# Patient Record
Sex: MALE | Race: WHITE | NOT HISPANIC OR LATINO | Employment: OTHER | URBAN - METROPOLITAN AREA
[De-identification: names, ages, dates, MRNs, and addresses within clinical notes are randomized per-mention and may not be internally consistent; named-entity substitution may affect disease eponyms.]

---

## 2017-03-09 DIAGNOSIS — M25.562 CHRONIC PAIN OF LEFT KNEE: ICD-10-CM

## 2017-03-09 DIAGNOSIS — G89.29 CHRONIC PAIN OF LEFT KNEE: ICD-10-CM

## 2017-04-07 ENCOUNTER — HOSPITAL ENCOUNTER (OUTPATIENT)
Dept: RADIOLOGY | Facility: MEDICAL CENTER | Age: 67
End: 2017-04-07
Attending: NURSE PRACTITIONER
Payer: MEDICARE

## 2017-04-07 DIAGNOSIS — G89.29 CHRONIC PAIN OF LEFT KNEE: ICD-10-CM

## 2017-04-07 DIAGNOSIS — M25.562 CHRONIC PAIN OF LEFT KNEE: ICD-10-CM

## 2017-04-07 PROCEDURE — 73721 MRI JNT OF LWR EXTRE W/O DYE: CPT | Mod: LT

## 2017-04-08 DIAGNOSIS — M25.562 ACUTE PAIN OF LEFT KNEE: ICD-10-CM

## 2017-06-02 RX ORDER — SIMVASTATIN 40 MG
TABLET ORAL
Qty: 90 TAB | Refills: 0 | Status: SHIPPED | OUTPATIENT
Start: 2017-06-02 | End: 2019-07-31

## 2017-10-10 ENCOUNTER — OFFICE VISIT (OUTPATIENT)
Dept: MEDICAL GROUP | Facility: PHYSICIAN GROUP | Age: 67
End: 2017-10-10
Payer: MEDICARE

## 2017-10-10 VITALS
WEIGHT: 162.2 LBS | BODY MASS INDEX: 27.69 KG/M2 | DIASTOLIC BLOOD PRESSURE: 80 MMHG | HEIGHT: 64 IN | OXYGEN SATURATION: 97 % | RESPIRATION RATE: 16 BRPM | TEMPERATURE: 98.4 F | HEART RATE: 86 BPM | SYSTOLIC BLOOD PRESSURE: 136 MMHG

## 2017-10-10 DIAGNOSIS — M25.562 ACUTE PAIN OF LEFT KNEE: ICD-10-CM

## 2017-10-10 DIAGNOSIS — C61 PROSTATE CANCER (HCC): ICD-10-CM

## 2017-10-10 DIAGNOSIS — E78.49 OTHER HYPERLIPIDEMIA: ICD-10-CM

## 2017-10-10 PROCEDURE — 99213 OFFICE O/P EST LOW 20 MIN: CPT | Performed by: NURSE PRACTITIONER

## 2017-10-10 ASSESSMENT — PATIENT HEALTH QUESTIONNAIRE - PHQ9: CLINICAL INTERPRETATION OF PHQ2 SCORE: 0

## 2017-10-10 NOTE — ASSESSMENT & PLAN NOTE
Patient was experiencing intermittent left knee pain. He was seen by orthopedics and treated with cortisone injections. He reports pain has decreased following treatment.

## 2017-10-11 NOTE — ASSESSMENT & PLAN NOTE
Patient has prostate cancer and is currently undergoing radiation treatments. He feels that he is doing well at this time. He took a leave of absence from [facility] receiving treatment.

## 2017-10-11 NOTE — PROGRESS NOTES
Subjective:     Chief Complaint   Patient presents with   • Prostate Cancer   • Medication Management   • Knee Pain     Lt        HPI:  Miguel Carranza is a 67 y.o. male here today to discuss the following:    Acute pain of left knee  Patient was experiencing intermittent left knee pain. He was seen by orthopedics and treated with cortisone injections. He reports pain has decreased following treatment.    Prostate cancer (CMS-HCC)  Patient has prostate cancer and is currently undergoing radiation treatments. He feels that he is doing well at this time. He took a leave of absence from [facility] receiving treatment.    Hyperlipidemia  Chronic Condition: Patient has hyperlipidemia and is currently taking simvastatin. He denies any chest pain, diaphoresis, shortness of breath, headaches, dizziness, blurred vision or myalgias.   Lab Results   Component Value Date/Time    CHOLSTRLTOT 212 (H) 10/21/2016 07:40 AM     (H) 10/21/2016 07:40 AM    HDL 43 10/21/2016 07:40 AM    TRIGLYCERIDE 174 (H) 10/21/2016 07:40 AM       Lab Results   Component Value Date/Time    SODIUM 140 10/21/2016 07:40 AM    SODIUM 138 01/21/2015 01:35 AM    POTASSIUM 5.1 10/21/2016 07:40 AM    POTASSIUM 4.0 01/21/2015 01:35 AM    CHLORIDE 101 10/21/2016 07:40 AM    CHLORIDE 106 01/21/2015 01:35 AM    CO2 22 10/21/2016 07:40 AM    CO2 27 01/21/2015 01:35 AM    GLUCOSE 109 (H) 10/21/2016 07:40 AM    GLUCOSE 96 01/21/2015 01:35 AM    BUN 17 10/21/2016 07:40 AM    BUN 14 01/21/2015 01:35 AM    CREATININE 1.07 10/21/2016 07:40 AM    CREATININE 1.09 01/21/2015 01:35 AM    BUNCREATRAT 16 10/21/2016 07:40 AM     Lab Results   Component Value Date/Time    ALKPHOSPHAT 78 10/21/2016 07:40 AM    ALKPHOSPHAT 81 01/12/2015 05:22 PM    ASTSGOT 21 10/21/2016 07:40 AM    ASTSGOT 20 01/12/2015 05:22 PM    ALTSGPT 30 10/21/2016 07:40 AM    ALTSGPT 32 01/12/2015 05:22 PM    TBILIRUBIN 0.4 10/21/2016 07:40 AM    TBILIRUBIN 0.4 01/12/2015 05:22 PM     "  .    Current medicines (including changes today)  Current Outpatient Prescriptions   Medication Sig Dispense Refill   • Ascorbic Acid (VITAMIN C ER PO) Take  by mouth.     • simvastatin (ZOCOR) 40 MG Tab TAKE 1 TAB BY MOUTH EVERY EVENING. 90 Tab 0   • Cholecalciferol (VITAMIN D) 2000 UNIT TABS Take 2,000 Units by mouth every day.     • therapeutic multivitamin-minerals (THERAGRAN-M) TABS Take 1 Tab by mouth every day.       No current facility-administered medications for this visit.        He  has a past medical history of Acute pain of left knee (11/1/2016); Cancer (CMS-[facility]) (01/15); Cold (01/04/15); Dental disorder; High cholesterol; Other specified disorder of intestines; and Snoring.    ROS   Review of Systems   Constitutional: Negative for fever, chills, weight loss and malaise/fatigue.   HENT: Negative for ear pain, nosebleeds, congestion, sore throat and neck pain.    Respiratory: Negative for cough, sputum production, shortness of breath and wheezing.    Cardiovascular: Negative for chest pain, palpitations,  and leg swelling.   Gastrointestinal: Negative for heartburn, nausea, vomiting, diarrhea and abdominal pain.   Neurological: Negative for dizziness, tingling, tremors, sensory change, focal weakness and headaches.   Psychiatric/Behavioral: Negative for depression, anxiety, suicidal ideas, insomnia and memory loss.    All other systems reviewed and are negative except as in HPI.     Objective:   Physical Exam:  Blood pressure 136/80, pulse 86, temperature 36.9 °C (98.4 °F), resp. rate 16, height 1.626 m (5' 4.02\"), weight 73.6 kg (162 lb 3.2 oz), SpO2 97 %. Body mass index is 27.82 kg/m².   Physical Exam:  Alert, oriented in no acute distress.  Eye contact is good, speech goal directed, affect calm  HEENT: conjunctiva non-injected, sclera non-icteric.  Pinna normal.   Neck No adenopathy or masses in the neck or supraclavicular regions.  Lungs: clear to auscultation bilaterally with good " excursion.  CV: regular rate and rhythm.   Abdomen: soft, nontender, No CVAT. Normal bowel sounds.  Ext: no edema, color normal, vascularity normal, temperature normal  MS: Normal gait and station    Assessment and Plan:   The following treatment plan was discussed  1. Other hyperlipidemia  COMP METABOLIC PANEL    LIPID PROFILE   2. Acute pain of left knee     3. Prostate cancer (CMS-HCC)      patient will complete labs following his last radiation treatment. He would like to postpone colonoscopy until radiation is completed. He will check with oncology prior to receiving an influenza vaccine.    Followup: Return in about 6 months (around 4/10/2018), or if symptoms worsen or fail to improve.   Please note that this dictation was created using voice recognition software. I have made every reasonable attempt to correct obvious errors, but I expect that there are errors of grammar and possibly content that I did not discover before finalizing the note.

## 2017-10-11 NOTE — ASSESSMENT & PLAN NOTE
Chronic Condition: Patient has hyperlipidemia and is currently taking simvastatin. He denies any chest pain, diaphoresis, shortness of breath, headaches, dizziness, blurred vision or myalgias.   Lab Results   Component Value Date/Time    CHOLSTRLTOT 212 (H) 10/21/2016 07:40 AM     (H) 10/21/2016 07:40 AM    HDL 43 10/21/2016 07:40 AM    TRIGLYCERIDE 174 (H) 10/21/2016 07:40 AM       Lab Results   Component Value Date/Time    SODIUM 140 10/21/2016 07:40 AM    SODIUM 138 01/21/2015 01:35 AM    POTASSIUM 5.1 10/21/2016 07:40 AM    POTASSIUM 4.0 01/21/2015 01:35 AM    CHLORIDE 101 10/21/2016 07:40 AM    CHLORIDE 106 01/21/2015 01:35 AM    CO2 22 10/21/2016 07:40 AM    CO2 27 01/21/2015 01:35 AM    GLUCOSE 109 (H) 10/21/2016 07:40 AM    GLUCOSE 96 01/21/2015 01:35 AM    BUN 17 10/21/2016 07:40 AM    BUN 14 01/21/2015 01:35 AM    CREATININE 1.07 10/21/2016 07:40 AM    CREATININE 1.09 01/21/2015 01:35 AM    BUNCREATRAT 16 10/21/2016 07:40 AM     Lab Results   Component Value Date/Time    ALKPHOSPHAT 78 10/21/2016 07:40 AM    ALKPHOSPHAT 81 01/12/2015 05:22 PM    ASTSGOT 21 10/21/2016 07:40 AM    ASTSGOT 20 01/12/2015 05:22 PM    ALTSGPT 30 10/21/2016 07:40 AM    ALTSGPT 32 01/12/2015 05:22 PM    TBILIRUBIN 0.4 10/21/2016 07:40 AM    TBILIRUBIN 0.4 01/12/2015 05:22 PM      .

## 2017-10-28 LAB
ALBUMIN SERPL-MCNC: 4.1 G/DL (ref 3.6–4.8)
ALBUMIN/GLOB SERPL: 2 {RATIO} (ref 1.2–2.2)
ALP SERPL-CCNC: 75 IU/L (ref 39–117)
ALT SERPL-CCNC: 27 IU/L (ref 0–44)
AST SERPL-CCNC: 20 IU/L (ref 0–40)
BILIRUB SERPL-MCNC: 0.6 MG/DL (ref 0–1.2)
BUN SERPL-MCNC: 17 MG/DL (ref 8–27)
BUN/CREAT SERPL: 18 (ref 10–24)
CALCIUM SERPL-MCNC: 9.3 MG/DL (ref 8.6–10.2)
CHLORIDE SERPL-SCNC: 103 MMOL/L (ref 96–106)
CHOLEST SERPL-MCNC: 200 MG/DL (ref 100–199)
CO2 SERPL-SCNC: 24 MMOL/L (ref 18–29)
COMMENT 011824: ABNORMAL
CREAT SERPL-MCNC: 0.93 MG/DL (ref 0.76–1.27)
GFR SERPLBLD CREATININE-BSD FMLA CKD-EPI: 85 ML/MIN/1.73
GFR SERPLBLD CREATININE-BSD FMLA CKD-EPI: 98 ML/MIN/1.73
GLOBULIN SER CALC-MCNC: 2.1 G/DL (ref 1.5–4.5)
GLUCOSE SERPL-MCNC: 107 MG/DL (ref 65–99)
HDLC SERPL-MCNC: 48 MG/DL
LDLC SERPL CALC-MCNC: 115 MG/DL (ref 0–99)
POTASSIUM SERPL-SCNC: 4.2 MMOL/L (ref 3.5–5.2)
PROT SERPL-MCNC: 6.2 G/DL (ref 6–8.5)
SODIUM SERPL-SCNC: 141 MMOL/L (ref 134–144)
TRIGL SERPL-MCNC: 186 MG/DL (ref 0–149)
VLDLC SERPL CALC-MCNC: 37 MG/DL (ref 5–40)

## 2018-02-12 ENCOUNTER — OFFICE VISIT (OUTPATIENT)
Dept: MEDICAL GROUP | Facility: CLINIC | Age: 68
End: 2018-02-12
Payer: MEDICARE

## 2018-02-12 VITALS
HEIGHT: 64 IN | HEART RATE: 72 BPM | OXYGEN SATURATION: 97 % | TEMPERATURE: 98.2 F | BODY MASS INDEX: 27.49 KG/M2 | SYSTOLIC BLOOD PRESSURE: 142 MMHG | DIASTOLIC BLOOD PRESSURE: 62 MMHG | WEIGHT: 161 LBS | RESPIRATION RATE: 14 BRPM

## 2018-02-12 DIAGNOSIS — C61 PROSTATE CANCER (HCC): ICD-10-CM

## 2018-02-12 DIAGNOSIS — F32.1 MODERATE SINGLE CURRENT EPISODE OF MAJOR DEPRESSIVE DISORDER (HCC): ICD-10-CM

## 2018-02-12 DIAGNOSIS — Z79.818 USE OF LEUPROLIDE ACETATE (LUPRON): ICD-10-CM

## 2018-02-12 PROCEDURE — 99214 OFFICE O/P EST MOD 30 MIN: CPT | Performed by: NURSE PRACTITIONER

## 2018-02-12 RX ORDER — SERTRALINE HYDROCHLORIDE 25 MG/1
TABLET, FILM COATED ORAL
COMMUNITY
Start: 2018-02-09 | End: 2018-02-12

## 2018-02-12 RX ORDER — MELOXICAM 15 MG/1
TABLET ORAL
COMMUNITY
Start: 2018-02-12 | End: 2018-09-30

## 2018-02-12 ASSESSMENT — PATIENT HEALTH QUESTIONNAIRE - PHQ9
9. THOUGHTS THAT YOU WOULD BE BETTER OFF DEAD, OR OF HURTING YOURSELF: 2
8. MOVING OR SPEAKING SO SLOWLY THAT OTHER PEOPLE COULD HAVE NOTICED. OR THE OPPOSITE, BEING SO FIGETY OR RESTLESS THAT YOU HAVE BEEN MOVING AROUND A LOT MORE THAN USUAL: 0
2. FEELING DOWN, DEPRESSED, IRRITABLE, OR HOPELESS: 2
SUM OF ALL RESPONSES TO PHQ QUESTIONS 1-9: 9
6. FEELING BAD ABOUT YOURSELF - OR THAT YOU ARE A FAILURE OR HAVE LET YOURSELF OR YOUR FAMILY DOWN: 1
3. TROUBLE FALLING OR STAYING ASLEEP OR SLEEPING TOO MUCH: 2
5. POOR APPETITE OR OVEREATING: 0
7. TROUBLE CONCENTRATING ON THINGS, SUCH AS READING THE NEWSPAPER OR WATCHING TELEVISION: 0
SUM OF ALL RESPONSES TO PHQ9 QUESTIONS 1 AND 2: 4
4. FEELING TIRED OR HAVING LITTLE ENERGY: 0
1. LITTLE INTEREST OR PLEASURE IN DOING THINGS: 2

## 2018-02-12 NOTE — PROGRESS NOTES
CC: Depression (Dr Romero did prescribe pt certaline 25 mg which he started last Friday; )        HPI:     Miguel is a patient of Mckayla Spann, all problems are new to me today, presents today for the followin. Moderate single current episode of major depressive disorder (CMS-[facility])  Patient states over the last several weeks he's been having increased symptoms of depression. He denies a mental health history including medications or treatments. He states that he feels like he doesn't have much meaning and is frustrated with some interim marital issues. Multifactorial including some trauma and bowling as a child, in [place], issues with finances.    He did talk with his radiation oncologist last week and voiced that he had had some thoughts of suicide. He was placed on sertraline 25 mg. He tolerated this well and completed 3 doses at this point. He denies any GI side effects. He denies any worsening mood.    Patient states that he has some generalized suicidal ideation however he does not have a plan. He does not any any weapons in the home. He denies any specific current risk to himself or others    Still working. Compliant with his medications.    2. Prostate cancer (CMS-[facility])/Use of leuprolide acetate (Lupron)  Patient had a prostatectomy approximately 3 years ago. He has had a recurrence of cancer and just completed radiation as well as Lupron therapy. His urologist and radiation oncologist to decide to stop Lupron given his current mood symptoms.    Current Outpatient Prescriptions   Medication Sig Dispense Refill   • sertraline (ZOLOFT) 50 MG Tab Take 1 Tab by mouth every day. 90 Tab 1   • meloxicam (MOBIC) 15 MG tablet      • Ascorbic Acid (VITAMIN C ER PO) Take  by mouth.     • simvastatin (ZOCOR) 40 MG Tab TAKE 1 TAB BY MOUTH EVERY EVENING. 90 Tab 0   • Cholecalciferol (VITAMIN D) 2000 UNIT TABS Take 2,000 Units by mouth every day.     • therapeutic multivitamin-minerals (THERAGRAN-M) TABS Take 1  "Tab by mouth every day.       No current facility-administered medications for this visit.      Social History   Substance Use Topics   • Smoking status: Former Smoker     Packs/day: 0.50     Years: 40.00     Types: Cigarettes, Cigars     Quit date: 11/1/2014   • Smokeless tobacco: Never Used   • Alcohol use 0.0 oz/week      Comment: Rarely; hx of heavy ETOH .one beer Monthly     I reviewed patients allergies, problem list and medications today in EPIC.    ROS: Any/all pertinent positives listed in the HPI, otherwise all others reviewed are negative today.      /62   Pulse 72   Temp 36.8 °C (98.2 °F)   Resp 14   Ht 1.626 m (5' 4\")   Wt 73 kg (161 lb)   SpO2 97%   BMI 27.64 kg/m²     Exam:    Gen: Alert and oriented, No apparent distress. WDWN  Psych: A+Ox3, normal affect and mood, good eye contact, appropriate responses and affect  Skin: Warm, dry and intact. Good turgor   No rashes in visible areas.  Eye: Conjunctiva clear, lids normal  ENMT: Lips without lesions, good dentition   Oropharynx clear.   Neck: No Lymphadenopathy, Thyromegaly, Bruits.   Trachea midline, no masses  Lungs: Unlabored breathing    Patient Health Questionaire    Little interest or pleasure in doing things?: 2   Feeling down, depressed, or hopeless?: 2 (hopeless)  Trouble falling or staying asleep, or sleeping too much? : 2  Feeling tired or having little energy? : 0  Poor appetite or overeating? : 0  Feeling bad about yourself - or that you are a failure or have let yourself or your family down? : 1 (inadequate)  Trouble concentrating on things, such as reading the newspaper or watching television? : 0  Moving or speaking so slowly that other people could have noticed.  Or the opposite - being so fidgety or restless that you have been moving around alot more than usual. : 0  Thoughts that you would be better off dead, or of hurting yourself?: 2  Patient Health Questionnaire Score: 9    If depressive symptoms identified deferred " to follow up visit unless specifically addressed in assesment and plan.        Assessment and Plan.   68 y.o. male with the following issues.    1. Moderate single current episode of major depressive disorder (CMS-HCC)  Stable. Encouraged patient at the next few days to increase to 50 mg daily. He'll hold that dose for the next 4 weeks. He'll contact my office if he is having any worsening mood or symptoms with the 50 mg dose and we will stop it. We also discussed self follow-up with me in the interim until he is able to establish new primary care which is establishing with in 2 months.   He understands if he has any increase in suicidal ideation or plan to notify the office. He understands to change his dose. We had a long discussion regarding psychiatry and psychology. I did encourage him to pursue therapy and we discussed data regarding this. He may consider a referral was placed today. We'll do a check of her thyroid and vitamin D as his aortic planning on being in the lab in a few weeks for his prostate levels.   Patient voiced understanding  - sertraline (ZOLOFT) 50 MG Tab; Take 1 Tab by mouth every day.  Dispense: 90 Tab; Refill: 1  - REFERRAL TO PSYCHOLOGY  - REFERRAL TO PSYCHIATRY  - TSH; Future  - VITAMIN D,25 HYDROXY; Future    2. Prostate cancer (CMS-HCC)/ Use of leuprolide acetate (Lupron)  Stable. He has a follow-up with urology of coming

## 2018-02-20 LAB
25(OH)D3+25(OH)D2 SERPL-MCNC: 30.1 NG/ML (ref 30–100)
TSH SERPL DL<=0.005 MIU/L-ACNC: 2.65 UIU/ML (ref 0.45–4.5)

## 2018-03-22 ENCOUNTER — HOSPITAL ENCOUNTER (OUTPATIENT)
Dept: RADIOLOGY | Facility: MEDICAL CENTER | Age: 68
End: 2018-03-22
Attending: PHYSICIAN ASSISTANT
Payer: MEDICARE

## 2018-03-22 ENCOUNTER — HOSPITAL ENCOUNTER (OUTPATIENT)
Facility: MEDICAL CENTER | Age: 68
End: 2018-03-22
Attending: PHYSICIAN ASSISTANT
Payer: MEDICARE

## 2018-03-22 ENCOUNTER — OFFICE VISIT (OUTPATIENT)
Dept: URGENT CARE | Facility: PHYSICIAN GROUP | Age: 68
End: 2018-03-22
Payer: MEDICARE

## 2018-03-22 VITALS
WEIGHT: 158 LBS | RESPIRATION RATE: 18 BRPM | BODY MASS INDEX: 26.98 KG/M2 | TEMPERATURE: 97.3 F | HEIGHT: 64 IN | HEART RATE: 60 BPM | DIASTOLIC BLOOD PRESSURE: 76 MMHG | OXYGEN SATURATION: 98 % | SYSTOLIC BLOOD PRESSURE: 128 MMHG

## 2018-03-22 DIAGNOSIS — R10.31 COLICKY RLQ ABDOMINAL PAIN: ICD-10-CM

## 2018-03-22 DIAGNOSIS — K40.20 BILATERAL INGUINAL HERNIA WITHOUT OBSTRUCTION OR GANGRENE, RECURRENCE NOT SPECIFIED: ICD-10-CM

## 2018-03-22 LAB
ALBUMIN SERPL BCP-MCNC: 4.5 G/DL (ref 3.2–4.9)
ALBUMIN/GLOB SERPL: 2 G/DL
ALP SERPL-CCNC: 70 U/L (ref 30–99)
ALT SERPL-CCNC: 28 U/L (ref 2–50)
ANION GAP SERPL CALC-SCNC: 10 MMOL/L (ref 0–11.9)
AST SERPL-CCNC: 24 U/L (ref 12–45)
BASOPHILS # BLD AUTO: 0.5 % (ref 0–1.8)
BASOPHILS # BLD: 0.03 K/UL (ref 0–0.12)
BILIRUB SERPL-MCNC: 0.6 MG/DL (ref 0.1–1.5)
BUN SERPL-MCNC: 15 MG/DL (ref 8–22)
CALCIUM SERPL-MCNC: 9.7 MG/DL (ref 8.5–10.5)
CHLORIDE SERPL-SCNC: 106 MMOL/L (ref 96–112)
CO2 SERPL-SCNC: 21 MMOL/L (ref 20–33)
CREAT SERPL-MCNC: 0.88 MG/DL (ref 0.5–1.4)
EOSINOPHIL # BLD AUTO: 0.11 K/UL (ref 0–0.51)
EOSINOPHIL NFR BLD: 1.8 % (ref 0–6.9)
ERYTHROCYTE [DISTWIDTH] IN BLOOD BY AUTOMATED COUNT: 41 FL (ref 35.9–50)
GLOBULIN SER CALC-MCNC: 2.2 G/DL (ref 1.9–3.5)
GLUCOSE SERPL-MCNC: 94 MG/DL (ref 65–99)
HCT VFR BLD AUTO: 40.2 % (ref 42–52)
HGB BLD-MCNC: 13.7 G/DL (ref 14–18)
IMM GRANULOCYTES # BLD AUTO: 0.02 K/UL (ref 0–0.11)
IMM GRANULOCYTES NFR BLD AUTO: 0.3 % (ref 0–0.9)
LYMPHOCYTES # BLD AUTO: 1.04 K/UL (ref 1–4.8)
LYMPHOCYTES NFR BLD: 17.3 % (ref 22–41)
MCH RBC QN AUTO: 30.8 PG (ref 27–33)
MCHC RBC AUTO-ENTMCNC: 34.1 G/DL (ref 33.7–35.3)
MCV RBC AUTO: 90.3 FL (ref 81.4–97.8)
MONOCYTES # BLD AUTO: 0.53 K/UL (ref 0–0.85)
MONOCYTES NFR BLD AUTO: 8.8 % (ref 0–13.4)
NEUTROPHILS # BLD AUTO: 4.29 K/UL (ref 1.82–7.42)
NEUTROPHILS NFR BLD: 71.3 % (ref 44–72)
NRBC # BLD AUTO: 0 K/UL
NRBC BLD-RTO: 0 /100 WBC
PLATELET # BLD AUTO: 217 K/UL (ref 164–446)
PMV BLD AUTO: 9.9 FL (ref 9–12.9)
POTASSIUM SERPL-SCNC: 4 MMOL/L (ref 3.6–5.5)
PROT SERPL-MCNC: 6.7 G/DL (ref 6–8.2)
RBC # BLD AUTO: 4.45 M/UL (ref 4.7–6.1)
SODIUM SERPL-SCNC: 137 MMOL/L (ref 135–145)
WBC # BLD AUTO: 6 K/UL (ref 4.8–10.8)

## 2018-03-22 PROCEDURE — 700117 HCHG RX CONTRAST REV CODE 255: Performed by: PHYSICIAN ASSISTANT

## 2018-03-22 PROCEDURE — 80053 COMPREHEN METABOLIC PANEL: CPT

## 2018-03-22 PROCEDURE — 85025 COMPLETE CBC W/AUTO DIFF WBC: CPT

## 2018-03-22 PROCEDURE — 74177 CT ABD & PELVIS W/CONTRAST: CPT

## 2018-03-22 PROCEDURE — 99214 OFFICE O/P EST MOD 30 MIN: CPT | Performed by: PHYSICIAN ASSISTANT

## 2018-03-22 RX ADMIN — IOHEXOL 50 ML: 240 INJECTION, SOLUTION INTRATHECAL; INTRAVASCULAR; INTRAVENOUS; ORAL at 19:00

## 2018-03-22 RX ADMIN — IOHEXOL 100 ML: 350 INJECTION, SOLUTION INTRAVENOUS at 19:00

## 2018-03-22 ASSESSMENT — ENCOUNTER SYMPTOMS
CONSTIPATION: 0
ABDOMINAL PAIN: 1
DIZZINESS: 1
ANOREXIA: 1
FEVER: 0
PALPITATIONS: 0
FLATUS: 1
NAUSEA: 1
HEARTBURN: 0
COUGH: 0
BLOOD IN STOOL: 0
VOMITING: 0
DIARRHEA: 1

## 2018-03-22 NOTE — PROGRESS NOTES
Subjective:      Miguel Carranza is a 68 y.o. male who presents with RLQ Pain (lower right abd pain with diarrhea, off and on x a couple of months with diarrhea)    PMH:  has a past medical history of Acute pain of left knee (11/1/2016); Cancer (CMS-[facility]) (01/15); Cold (01/04/15); Dental disorder; High cholesterol; Other specified disorder of intestines; and Snoring.  MEDS:   Current Outpatient Prescriptions:   •  sertraline (ZOLOFT) 50 MG Tab, Take 1 Tab by mouth every day., Disp: 90 Tab, Rfl: 1  •  simvastatin (ZOCOR) 40 MG Tab, TAKE 1 TAB BY MOUTH EVERY EVENING., Disp: 90 Tab, Rfl: 0  •  Cholecalciferol (VITAMIN D) 2000 UNIT TABS, Take 2,000 Units by mouth every day., Disp: , Rfl:   •  therapeutic multivitamin-minerals (THERAGRAN-M) TABS, Take 1 Tab by mouth every day., Disp: , Rfl:   •  meloxicam (MOBIC) 15 MG tablet, , Disp: , Rfl:   •  Ascorbic Acid (VITAMIN C ER PO), Take  by mouth., Disp: , Rfl:   ALLERGIES: No Known Allergies  SURGHX:   Past Surgical History:   Procedure Laterality Date   • PROSTATECTOMY ROBOTIC XI  1/20/2015    Performed by Brandyn Brooke M.D. at [facility]   • VASECTOMY  1990s     SOCHX:  reports that he quit smoking about 3 years ago. His smoking use included Cigarettes and Cigars. He has a 20.00 pack-year smoking history. He has never used smokeless tobacco. He reports that he drinks alcohol. He reports that he does not use drugs.  FH: family history includes Heart Disease in his father; Stroke in his mother. Reviewed with patient/family. Not pertinent to this complaint.              RLQ Pain   This is a new problem. The current episode started today. The onset quality is sudden. The problem occurs constantly. The problem has been unchanged. The pain is located in the RLQ. The pain is at a severity of 6/10. The quality of the pain is cramping, colicky and sharp. The abdominal pain does not radiate. Associated symptoms include anorexia, diarrhea (on/off for months),  "flatus and nausea. Pertinent negatives include no constipation, fever, frequency, hematuria, melena or vomiting. The pain is aggravated by deep breathing, movement and palpation. The pain is relieved by nothing. He has tried antacids for the symptoms. The treatment provided no relief. There is no history of colon cancer. prostate CA       Review of Systems   Constitutional: Negative for fever and malaise/fatigue.   HENT: Negative for congestion.    Respiratory: Negative for cough.    Cardiovascular: Negative for chest pain and palpitations.   Gastrointestinal: Positive for abdominal pain, anorexia, diarrhea (on/off for months), flatus and nausea. Negative for blood in stool, constipation, heartburn, melena and vomiting.   Genitourinary: Negative for frequency and hematuria.   Neurological: Positive for dizziness (resolved).   All other systems reviewed and are negative.         Objective:     /76   Pulse 60   Temp 36.3 °C (97.3 °F)   Resp 18   Ht 1.626 m (5' 4\")   Wt 71.7 kg (158 lb)   SpO2 98%   BMI 27.12 kg/m²      Physical Exam   Constitutional: He is oriented to person, place, and time. He appears well-developed and well-nourished. No distress.   HENT:   Head: Normocephalic and atraumatic.   Nose: Nose normal.   Mouth/Throat: Oropharynx is clear and moist.   Eyes: Conjunctivae and EOM are normal. Pupils are equal, round, and reactive to light.   Neck: Normal range of motion.   Cardiovascular: Regular rhythm and normal heart sounds.    Pulmonary/Chest: Effort normal and breath sounds normal.   Abdominal: Soft. Bowel sounds are increased. There is tenderness in the right lower quadrant. There is tenderness at McBurney's point. There is no rigidity, no rebound, no guarding and negative Sinha's sign.       Musculoskeletal: Normal range of motion.   Neurological: He is alert and oriented to person, place, and time. Gait normal.   Skin: Skin is warm and dry. Capillary refill takes less than 2 seconds. "   Psychiatric: He has a normal mood and affect.   Nursing note and vitals reviewed.         CT reviewed by me, read by radiololgy:     Impression       No evidence of bowel obstruction or acute appendicitis.  Diverticula colon. No free fluid.  2.7 mm nonobstructing right renal stone. No hydronephrosis.  Tiny hypodensity left kidney likely a cyst but incompletely characterized.  Atherosclerotic changes including coronary artery calcifications.  Tiny adrenal gland nodules.  Bilateral inguinal hernias containing fat.   Reading Provider Reading Date   Ayad Howard M.D. Mar 22, 2018   Signing Provider Signing Date Signing Time   Ayad Howard M.D. Mar 22, 2018  6:54 PM          Assessment/Plan:     1. Colicky RLQ abdominal pain  CT-ABDOMEN-PELVIS WITH    CBC WITH DIFFERENTIAL    COMP METABOLIC PANEL    CANCELED: CT-ABDOMEN-PELVIS WITH   2. Bilateral inguinal hernia without obstruction or gangrene, recurrence not specified       PT should follow up with PCP in 1-2 days for re-evaluation if symptoms have not improved.  Discussed red flags and reasons to return to UC or ED.  Pt and/or family verbalized understanding of diagnosis and follow up instructions and was offered informational handout on diagnosis.  PT discharged.

## 2018-03-22 NOTE — LETTER
March 22, 2018         Patient: Miguel Carranza   YOB: 1950   Date of Visit: 3/22/2018           To Whom it May Concern:    Miguel Carranza was seen in my clinic on 3/22/2018. He may return to work today, but he needs to return to this clinic today by 4:45pm..    If you have any questions or concerns, please don't hesitate to call.        Sincerely,           Lisa Nunes P.A.-C.  Electronically Signed

## 2018-04-17 ENCOUNTER — OFFICE VISIT (OUTPATIENT)
Dept: MEDICAL GROUP | Facility: PHYSICIAN GROUP | Age: 68
End: 2018-04-17
Payer: MEDICARE

## 2018-04-17 VITALS
HEIGHT: 64 IN | BODY MASS INDEX: 26.98 KG/M2 | HEART RATE: 68 BPM | SYSTOLIC BLOOD PRESSURE: 128 MMHG | OXYGEN SATURATION: 98 % | WEIGHT: 158 LBS | DIASTOLIC BLOOD PRESSURE: 60 MMHG | TEMPERATURE: 98.1 F | RESPIRATION RATE: 16 BRPM

## 2018-04-17 DIAGNOSIS — C61 PROSTATE CANCER (HCC): ICD-10-CM

## 2018-04-17 DIAGNOSIS — Z76.89 ENCOUNTER TO ESTABLISH CARE WITH NEW DOCTOR: ICD-10-CM

## 2018-04-17 DIAGNOSIS — E78.49 OTHER HYPERLIPIDEMIA: ICD-10-CM

## 2018-04-17 PROCEDURE — 99213 OFFICE O/P EST LOW 20 MIN: CPT | Performed by: NURSE PRACTITIONER

## 2018-04-17 NOTE — ASSESSMENT & PLAN NOTE
"Prostate was removed 3 years ago.  Cancer came back and he had radiation last summer.  Had Lupron which he states affected him \"mentally\", so he is now on Zoloft.  "

## 2018-04-17 NOTE — PROGRESS NOTES
"  Chief Complaint   Patient presents with   • Hyperlipidemia       HISTORY OF PRESENT ILLNESS: Patient is a 68 y.o. male new patient who presents today to discuss the following issues:    Encounter to establish care with new doctor  Is here to establish with a new primary care provider.  Was previously seen by Kamilah Spann.    Prostate cancer (CMS-HCC)  Prostate was removed 3 years ago.  Cancer came back and he had radiation last summer.  Had Lupron which he states affected him \"mentally\", so he is now on Zoloft.    Hyperlipidemia  Chronic in nature.  Stable on meds.        Patient Active Problem List    Diagnosis Date Noted   • Encounter to establish care with new doctor 2018   • Elevated glucose 2016   • Hyperlipidemia 2016   • Erectile dysfunction 2016   • Prostate cancer (CMS-HCC) 2015       Allergies:Patient has no known allergies.    Current Outpatient Prescriptions   Medication Sig Dispense Refill   • sertraline (ZOLOFT) 50 MG Tab Take 1 Tab by mouth every day. 90 Tab 1   • Ascorbic Acid (VITAMIN C ER PO) Take  by mouth.     • simvastatin (ZOCOR) 40 MG Tab TAKE 1 TAB BY MOUTH EVERY EVENING. 90 Tab 0   • Cholecalciferol (VITAMIN D) 2000 UNIT TABS Take 2,000 Units by mouth every day.     • therapeutic multivitamin-minerals (THERAGRAN-M) TABS Take 1 Tab by mouth every day.     • meloxicam (MOBIC) 15 MG tablet        No current facility-administered medications for this visit.        Social History   Substance Use Topics   • Smoking status: Former Smoker     Packs/day: 0.50     Years: 40.00     Types: Cigarettes, Cigars     Quit date: 2014   • Smokeless tobacco: Never Used   • Alcohol use 0.0 oz/week      Comment: Rarely; hx of heavy ETOH .one beer Monthly       Family Status   Relation Status   • Mother    • Father    • Sister Alive   • Brother Alive   • Sister Alive     Family History   Problem Relation Age of Onset   • Stroke Mother    • Heart Disease " "Father        Review of Systems:   Constitutional: Negative for fever, chills, weight loss and malaise/fatigue.   HENT: Negative for ear pain, nosebleeds, congestion, sore throat and neck pain.    Eyes: Negative for blurred vision.   Respiratory: Negative for cough, sputum production, shortness of breath and wheezing.    Cardiovascular: Negative for chest pain, palpitations, orthopnea and leg swelling.   Gastrointestinal: Negative for heartburn, nausea, vomiting and abdominal pain.   Genitourinary: Negative for dysuria, urgency and frequency.   Musculoskeletal: Negative for myalgias, joint pain, and back pain.  Skin: Negative for rash and itching.   Neurological: Negative for dizziness, tingling, tremors, sensory change, focal weakness and headaches.   Endo/Heme/Allergies: Does not bruise/bleed easily.   Psychiatric/Behavioral: Negative for depression, suicidal ideas and memory loss.  The patient is not nervous/anxious and does not have insomnia.    All other systems reviewed and are negative except as in HPI.    Exam:  Blood pressure 128/60, pulse 68, temperature 36.7 °C (98.1 °F), resp. rate 16, height 1.626 m (5' 4\"), weight 71.7 kg (158 lb), SpO2 98 %.  General:  Well nourished, well developed male in NAD  Head: Grossly normal.  Neck: Supple without JVD or bruit. Thyroid is not enlarged.  Pulmonary: Clear to ausculation. Normal effort. No rales, ronchi, or wheezing.  Cardiovascular: Regular rate and rhythm without murmur.   Abdomen:  Bowel sounds + x 4. Soft, non-tender, nondistended.  Extremities: No clubbing, cyanosis, or edema.  Skin: Intact with no obvious rashes or lesions.  Neuro: Grossly intact.  Psych: Alert and oriented x 3.  Mood and affect appropriate.    Medical decision-making and discussion: Miguel is here to establish with a new primary care provider.  We reviewed his past medical history and discussed his current medications. He will sign a records release for his previous provider, he will sign " up with [facility], and he will plan to follow-up here as needed.         Assessment/Plan:  1. Encounter to establish care with new doctor     2. Prostate cancer (CMS-HCC)     3. Other hyperlipidemia         Return if symptoms worsen or fail to improve.    Please note that this dictation was created using voice recognition software. I have made every reasonable attempt to correct obvious errors, but I expect that there are errors of grammar and possibly content that I did not discover before finalizing the note.

## 2018-04-18 PROBLEM — Z79.818 USE OF LEUPROLIDE ACETATE (LUPRON): Status: RESOLVED | Noted: 2018-02-12 | Resolved: 2018-04-18

## 2018-05-02 ENCOUNTER — HOSPITAL ENCOUNTER (OUTPATIENT)
Dept: HOSPITAL 8 - STAR | Age: 68
End: 2018-05-02
Attending: SURGERY
Payer: MEDICARE

## 2018-05-02 DIAGNOSIS — Z01.818: Primary | ICD-10-CM

## 2018-05-02 LAB
ANION GAP SERPL CALC-SCNC: 8 MMOL/L (ref 5–15)
BASOPHILS # BLD AUTO: 0.01 X10^3/UL (ref 0–0.1)
BASOPHILS NFR BLD AUTO: 0 % (ref 0–1)
CALCIUM SERPL-MCNC: 8.7 MG/DL (ref 8.5–10.1)
CHLORIDE SERPL-SCNC: 106 MMOL/L (ref 98–107)
CREAT SERPL-MCNC: 0.99 MG/DL (ref 0.7–1.3)
EOSINOPHIL # BLD AUTO: 0.12 X10^3/UL (ref 0–0.4)
EOSINOPHIL NFR BLD AUTO: 2 % (ref 1–7)
ERYTHROCYTE [DISTWIDTH] IN BLOOD BY AUTOMATED COUNT: 12.8 % (ref 9.4–14.8)
LYMPHOCYTES # BLD AUTO: 1.06 X10^3/UL (ref 1–3.4)
LYMPHOCYTES NFR BLD AUTO: 15 % (ref 22–44)
MCH RBC QN AUTO: 30.6 PG (ref 27.5–34.5)
MCHC RBC AUTO-ENTMCNC: 33.7 G/DL (ref 33.2–36.2)
MCV RBC AUTO: 90.8 FL (ref 81–97)
MD: NO
MONOCYTES # BLD AUTO: 0.68 X10^3/UL (ref 0.2–0.8)
MONOCYTES NFR BLD AUTO: 9 % (ref 2–9)
NEUTROPHILS # BLD AUTO: 5.37 X10^3/UL (ref 1.8–6.8)
NEUTROPHILS NFR BLD AUTO: 74 % (ref 42–75)
PLATELET # BLD AUTO: 240 X10^3/UL (ref 130–400)
PMV BLD AUTO: 7.4 FL (ref 7.4–10.4)
RBC # BLD AUTO: 4.42 X10^6/UL (ref 4.38–5.82)

## 2018-05-02 PROCEDURE — 93005 ELECTROCARDIOGRAM TRACING: CPT

## 2018-05-02 PROCEDURE — 80048 BASIC METABOLIC PNL TOTAL CA: CPT

## 2018-05-02 PROCEDURE — 36415 COLL VENOUS BLD VENIPUNCTURE: CPT

## 2018-05-02 PROCEDURE — 85025 COMPLETE CBC W/AUTO DIFF WBC: CPT

## 2018-05-07 ENCOUNTER — HOSPITAL ENCOUNTER (OUTPATIENT)
Dept: HOSPITAL 8 - OUT | Age: 68
Discharge: HOME | End: 2018-05-07
Attending: SURGERY
Payer: MEDICARE

## 2018-05-07 VITALS — DIASTOLIC BLOOD PRESSURE: 57 MMHG | SYSTOLIC BLOOD PRESSURE: 123 MMHG

## 2018-05-07 VITALS — HEIGHT: 64 IN | WEIGHT: 155.65 LBS | BODY MASS INDEX: 26.57 KG/M2

## 2018-05-07 DIAGNOSIS — F41.9: ICD-10-CM

## 2018-05-07 DIAGNOSIS — E78.00: ICD-10-CM

## 2018-05-07 DIAGNOSIS — K40.90: Primary | ICD-10-CM

## 2018-05-07 PROCEDURE — 49505 PRP I/HERN INIT REDUC >5 YR: CPT

## 2018-05-07 PROCEDURE — C1781 MESH (IMPLANTABLE): HCPCS

## 2018-09-30 ENCOUNTER — HOSPITAL ENCOUNTER (EMERGENCY)
Facility: MEDICAL CENTER | Age: 68
End: 2018-09-30
Attending: EMERGENCY MEDICINE
Payer: MEDICARE

## 2018-09-30 ENCOUNTER — APPOINTMENT (OUTPATIENT)
Dept: RADIOLOGY | Facility: MEDICAL CENTER | Age: 68
End: 2018-09-30
Attending: EMERGENCY MEDICINE
Payer: MEDICARE

## 2018-09-30 VITALS
HEART RATE: 64 BPM | TEMPERATURE: 97.8 F | DIASTOLIC BLOOD PRESSURE: 84 MMHG | HEIGHT: 64 IN | OXYGEN SATURATION: 94 % | SYSTOLIC BLOOD PRESSURE: 133 MMHG | RESPIRATION RATE: 18 BRPM | BODY MASS INDEX: 26.01 KG/M2 | WEIGHT: 152.34 LBS

## 2018-09-30 DIAGNOSIS — N23 URETERAL COLIC: ICD-10-CM

## 2018-09-30 LAB
APPEARANCE UR: CLEAR
BACTERIA #/AREA URNS HPF: ABNORMAL /HPF
BILIRUB UR QL STRIP.AUTO: NEGATIVE
COLOR UR: YELLOW
EPI CELLS #/AREA URNS HPF: ABNORMAL /HPF
GLUCOSE UR STRIP.AUTO-MCNC: NEGATIVE MG/DL
KETONES UR STRIP.AUTO-MCNC: NEGATIVE MG/DL
LEUKOCYTE ESTERASE UR QL STRIP.AUTO: NEGATIVE
MICRO URNS: ABNORMAL
MUCOUS THREADS #/AREA URNS HPF: ABNORMAL /HPF
NITRITE UR QL STRIP.AUTO: NEGATIVE
PH UR STRIP.AUTO: 6 [PH]
PROT UR QL STRIP: NEGATIVE MG/DL
RBC # URNS HPF: ABNORMAL /HPF
RBC UR QL AUTO: ABNORMAL
SP GR UR STRIP.AUTO: 1.01
WBC #/AREA URNS HPF: ABNORMAL /HPF

## 2018-09-30 PROCEDURE — 700111 HCHG RX REV CODE 636 W/ 250 OVERRIDE (IP): Performed by: EMERGENCY MEDICINE

## 2018-09-30 PROCEDURE — 99284 EMERGENCY DEPT VISIT MOD MDM: CPT

## 2018-09-30 PROCEDURE — 96374 THER/PROPH/DIAG INJ IV PUSH: CPT

## 2018-09-30 PROCEDURE — 76775 US EXAM ABDO BACK WALL LIM: CPT

## 2018-09-30 PROCEDURE — 81001 URINALYSIS AUTO W/SCOPE: CPT

## 2018-09-30 PROCEDURE — 96375 TX/PRO/DX INJ NEW DRUG ADDON: CPT

## 2018-09-30 RX ORDER — TAMSULOSIN HYDROCHLORIDE 0.4 MG/1
0.4 CAPSULE ORAL DAILY
Qty: 7 CAP | Refills: 0 | Status: SHIPPED | OUTPATIENT
Start: 2018-09-30 | End: 2018-10-07

## 2018-09-30 RX ORDER — KETOROLAC TROMETHAMINE 30 MG/ML
15 INJECTION, SOLUTION INTRAMUSCULAR; INTRAVENOUS ONCE
Status: COMPLETED | OUTPATIENT
Start: 2018-09-30 | End: 2018-09-30

## 2018-09-30 RX ADMIN — KETOROLAC TROMETHAMINE 15 MG: 30 INJECTION, SOLUTION INTRAMUSCULAR at 20:15

## 2018-09-30 RX ADMIN — FENTANYL CITRATE 50 MCG: 50 INJECTION INTRAMUSCULAR; INTRAVENOUS at 20:15

## 2018-09-30 ASSESSMENT — PAIN SCALES - GENERAL: PAINLEVEL_OUTOF10: 6

## 2018-10-01 ENCOUNTER — PATIENT OUTREACH (OUTPATIENT)
Dept: HEALTH INFORMATION MANAGEMENT | Facility: OTHER | Age: 68
End: 2018-10-01

## 2018-10-01 NOTE — ED PROVIDER NOTES
ED Provider Note    Scribed for Edward Chopra M.D. by Edward Chopra. 9/30/2018,  7:49 PM.    CHIEF COMPLAINT  Chief Complaint   Patient presents with   • Flank Pain     pt diagnosed with kidney stone last sunday at [facility], Pt states that he saw his urologist on monday. The pain has been getting progressively worse over the last day.       HPI  Miguel Carranza is a 68 y.o. male who presents to the Emergency Department for acute on subacute right lower quadrant abdominal pain, identical to kidney stone pain, diagnosed as ureteral colic at [facility] 10 days ago.  The patient presented there with acute onset pain, got a CT scan which he says showed a 3.8 mm ureteral stone.  He said he knew about the stone as an incidental finding from a previous CT scan obtained in the process of evaluating and fixing an abdominal hernia.  He was prescribed Flomax, which he says he has not been taking, and a few Norco.  He said that he was doing somewhat better, and saw his urologist, Dr. Anderson, while he was doing okay, and was told to expect that the stone should pass and that he should continue straining his urine.  He says that yesterday, he started having much more acute severe colicky right lower quadrant pain again.  This caused some difficulty sleeping.  On this pain continued today, he presented for evaluation.  He is awake, alert, pleasant, cooperative, shows no signs of distress, is not having difficulty finding a position of comfort, is not diaphoretic, but reports that he now has  a dull constant pain in between episodes of sharp or colicky pain.  He denies fevers or chills, nausea or vomiting, or any obvious hematuria or any dysuria.    REVIEW OF SYSTEMS  See HPI for further details. All other systems are negative.     PAST MEDICAL HISTORY   has a past medical history of Acute pain of left knee (11/1/2016); Cancer (HCC) (01/15); Cold (01/04/15); Dental disorder; High cholesterol; Other  "specified disorder of intestines; and Snoring.    SOCIAL HISTORY  Social History     Social History Main Topics   • Smoking status: Current Every Day Smoker     Packs/day: 0.50     Years: 40.00     Types: Cigarettes, Cigars     Last attempt to quit: 11/1/2014   • Smokeless tobacco: Never Used   • Alcohol use 0.0 oz/week      Comment: 3-4x/week 1-2 drinks   • Drug use: No   • Sexual activity: Yes     Partners: Female      Comment: previous history of gonorrhea when stationed in [place]     History   Drug Use No       SURGICAL HISTORY   has a past surgical history that includes vasectomy (1990s); prostatectomy robotic xi (1/20/2015); and inguinal hernia repair (07/2018).    CURRENT MEDICATIONS  Home Medications     Reviewed by Carmen Mills R.N. (Registered Nurse) on 09/30/18 at 1910  Med List Status: Partial   Medication Last Dose Status   Cholecalciferol (VITAMIN D) 2000 UNIT TABS 4/10/2018 Active   sertraline (ZOLOFT) 50 MG Tab 9/30/2018 Active   simvastatin (ZOCOR) 40 MG Tab 9/29/2018 Active   therapeutic multivitamin-minerals (THERAGRAN-M) TABS 9/30/2018 Active                ALLERGIES  No Known Allergies    PHYSICAL EXAM  VITAL SIGNS: /84   Pulse (!) 59   Temp 36.6 °C (97.8 °F)   Resp 18   Ht 1.626 m (5' 4\")   Wt 69.1 kg (152 lb 5.4 oz)   SpO2 93%   BMI 26.15 kg/m²   Pulse ox interpretation: I interpret this pulse ox as normal.  Constitutional: Alert in no apparent distress.  HENT: No signs of trauma, Bilateral external ears normal, Nose normal.   Eyes: Conjunctiva normal, Non-icteric.   Neck: Normal range of motion, Supple, No stridor.   Lymphatic: No lymphadenopathy noted.   Cardiovascular: Regular rate and rhythm, no murmurs.   Thorax & Lungs: Normal breath sounds, No respiratory distress, No wheezing, No chest tenderness.   Abdomen: Bowel sounds normal, Soft, No tenderness, No masses, No pulsatile masses. No peritoneal signs.  Skin: Warm, Dry, No erythema, No rash.   Back: No CVA " tenderness.  Extremities: Intact distal pulses, No edema, No cyanosis.  Musculoskeletal: Good range of motion in all major joints. No or major deformities noted.   Neurologic: Alert , Normal motor function, Normal sensory function, No focal deficits noted.   Psychiatric: Affect normal, Judgment normal, Mood normal.     DIAGNOSTIC STUDIES / PROCEDURES      LABS  Labs Reviewed   URINALYSIS,CULTURE IF INDICATED - Abnormal; Notable for the following:        Result Value    Occult Blood Trace (*)     All other components within normal limits   URINE MICROSCOPIC (W/UA) - Abnormal; Notable for the following:     WBC 0-2 (*)     RBC 0-2 (*)     Bacteria Rare (*)     All other components within normal limits     All labs reviewed by me.    RADIOLOGY  US-RENAL    (Results Pending)     The radiologist's interpretation of all radiological studies have been reviewed by me.    COURSE & MEDICAL DECISION MAKING  Nursing notes, VS, PMSFHx reviewed in chart.     7:49 PM Patient seen and examined at bedside.  He has no tenderness, despite some pain.  Differential diagnosis includes but is not limited to ureteral stone, ureteral colic, urinary tract infection, less likely other intra-abdominal infection such as appendicitis, given the nearly identical symptoms to recently diagnosed kidney stone, the colicky type of pain, and the lack of abnormal vital signs. Ordered for renal ultrasound and urinalysis to evaluate. Patient will be treated with Toradol and fentanyl for his symptoms.     8:48 PM this patient's urinalysis shows trace blood with rare bacteria.    10:16 PM this patient's ultrasound is still pending.  I returned to the bedside again, and he continues to rest comfortably.  He says that occasionally he gets a very brief sharp pain in the same right lower quadrant area, but it is not causing him much distress and he feels the medications been helpful.  We discussed the regardless of his ultrasound results, he is to call  his  urologist tomorrow, and continue to strain his urine.  The patient's care will be trans-referred to my partner, Dr. Benavidez, in anticipation of disposition after the patient's ultrasound returns.  Unless there is evidence of obstruction, I think the patient can be discharged home.    DISPOSITION:  Patient will be transferred to Dr. Benavidez in improved condition.      FINAL IMPRESSION  1. Ureteral colic Active     Patient seen and evaluated by myself following signout by Dr. Chopra.  The patient is well-appearing, stable vital signs and no signs of systemic illness.  Ultrasound was reviewed and demonstrated mild hydronephrosis on the left side with no visualized left-sided ureteral jet.  While this likely represents an intermittent obstruction based on the size of the stone, I contacted the urologist group that he is established with and appraise them of the situation.  They are in agreement with my assessment that the patient just needs routine follow-up and was given very strict return precautions should he develop any fevers chills worsening abdominal discomfort.  We discussed this and he feels comfortable going home at this time.

## 2018-10-01 NOTE — ED NOTES
Pt seen at bedside by ERP; pt given discharge instructions and updated prescription, pt verbalized understanding of all information given. Pt ambulated out of ED w/o difficulty.

## 2018-10-01 NOTE — PROGRESS NOTES
Placed discharge outreach phone call to pt s/p ER discharge 9/30/18.  Left message with pt's wife Jaqueline, providing my contact information and instructions for pt to call with any questions or concerns.

## 2018-10-01 NOTE — ED TRIAGE NOTES
".  Chief Complaint   Patient presents with   • Flank Pain     pt diagnosed with kidney stone last sunday at [facility], Pt states that he saw his urologist on monday. The pain has been getting progressively worse over the last day.     ./84   Pulse 76   Temp 36.6 °C (97.8 °F)   Resp 18   Ht 1.626 m (5' 4\")   Wt 69.1 kg (152 lb 5.4 oz)   SpO2 99%   BMI 26.15 kg/m²     "

## 2019-07-31 ENCOUNTER — HOSPITAL ENCOUNTER (EMERGENCY)
Facility: MEDICAL CENTER | Age: 69
End: 2019-07-31
Attending: EMERGENCY MEDICINE
Payer: MEDICARE

## 2019-07-31 VITALS
OXYGEN SATURATION: 95 % | TEMPERATURE: 97.3 F | HEART RATE: 59 BPM | RESPIRATION RATE: 18 BRPM | WEIGHT: 162.7 LBS | BODY MASS INDEX: 27.78 KG/M2 | SYSTOLIC BLOOD PRESSURE: 120 MMHG | HEIGHT: 64 IN | DIASTOLIC BLOOD PRESSURE: 81 MMHG

## 2019-07-31 DIAGNOSIS — R42 LIGHTHEADEDNESS: ICD-10-CM

## 2019-07-31 LAB
ALBUMIN SERPL BCP-MCNC: 4.4 G/DL (ref 3.2–4.9)
ALBUMIN/GLOB SERPL: 1.6 G/DL
ALP SERPL-CCNC: 68 U/L (ref 30–99)
ALT SERPL-CCNC: 24 U/L (ref 2–50)
ANION GAP SERPL CALC-SCNC: 10 MMOL/L (ref 0–11.9)
APPEARANCE UR: CLEAR
AST SERPL-CCNC: 19 U/L (ref 12–45)
BASOPHILS # BLD AUTO: 0.2 % (ref 0–1.8)
BASOPHILS # BLD: 0.02 K/UL (ref 0–0.12)
BILIRUB SERPL-MCNC: 0.9 MG/DL (ref 0.1–1.5)
BILIRUB UR QL STRIP.AUTO: NEGATIVE
BUN SERPL-MCNC: 16 MG/DL (ref 8–22)
CALCIUM SERPL-MCNC: 9.1 MG/DL (ref 8.4–10.2)
CHLORIDE SERPL-SCNC: 104 MMOL/L (ref 96–112)
CO2 SERPL-SCNC: 22 MMOL/L (ref 20–33)
COLOR UR: YELLOW
CREAT SERPL-MCNC: 1.17 MG/DL (ref 0.5–1.4)
EKG IMPRESSION: NORMAL
EOSINOPHIL # BLD AUTO: 0.11 K/UL (ref 0–0.51)
EOSINOPHIL NFR BLD: 1.2 % (ref 0–6.9)
ERYTHROCYTE [DISTWIDTH] IN BLOOD BY AUTOMATED COUNT: 38.9 FL (ref 35.9–50)
GLOBULIN SER CALC-MCNC: 2.8 G/DL (ref 1.9–3.5)
GLUCOSE SERPL-MCNC: 88 MG/DL (ref 65–99)
GLUCOSE UR STRIP.AUTO-MCNC: NEGATIVE MG/DL
HCT VFR BLD AUTO: 42.1 % (ref 42–52)
HGB BLD-MCNC: 14.5 G/DL (ref 14–18)
IMM GRANULOCYTES # BLD AUTO: 0.03 K/UL (ref 0–0.11)
IMM GRANULOCYTES NFR BLD AUTO: 0.3 % (ref 0–0.9)
KETONES UR STRIP.AUTO-MCNC: NEGATIVE MG/DL
LEUKOCYTE ESTERASE UR QL STRIP.AUTO: NEGATIVE
LYMPHOCYTES # BLD AUTO: 1.26 K/UL (ref 1–4.8)
LYMPHOCYTES NFR BLD: 14.3 % (ref 22–41)
MCH RBC QN AUTO: 30.3 PG (ref 27–33)
MCHC RBC AUTO-ENTMCNC: 34.4 G/DL (ref 33.7–35.3)
MCV RBC AUTO: 87.9 FL (ref 81.4–97.8)
MICRO URNS: NORMAL
MONOCYTES # BLD AUTO: 0.73 K/UL (ref 0–0.85)
MONOCYTES NFR BLD AUTO: 8.3 % (ref 0–13.4)
NEUTROPHILS # BLD AUTO: 6.67 K/UL (ref 1.82–7.42)
NEUTROPHILS NFR BLD: 75.7 % (ref 44–72)
NITRITE UR QL STRIP.AUTO: NEGATIVE
NRBC # BLD AUTO: 0 K/UL
NRBC BLD-RTO: 0 /100 WBC
PH UR STRIP.AUTO: 5.5 [PH] (ref 5–8)
PLATELET # BLD AUTO: 207 K/UL (ref 164–446)
PMV BLD AUTO: 9.5 FL (ref 9–12.9)
POTASSIUM SERPL-SCNC: 3.8 MMOL/L (ref 3.6–5.5)
PROT SERPL-MCNC: 7.2 G/DL (ref 6–8.2)
PROT UR QL STRIP: NEGATIVE MG/DL
RBC # BLD AUTO: 4.79 M/UL (ref 4.7–6.1)
RBC UR QL AUTO: NEGATIVE
SODIUM SERPL-SCNC: 136 MMOL/L (ref 135–145)
SP GR UR STRIP.AUTO: <=1.005
TROPONIN T SERPL-MCNC: 6 NG/L (ref 6–19)
WBC # BLD AUTO: 8.8 K/UL (ref 4.8–10.8)

## 2019-07-31 PROCEDURE — 80053 COMPREHEN METABOLIC PANEL: CPT

## 2019-07-31 PROCEDURE — 99284 EMERGENCY DEPT VISIT MOD MDM: CPT

## 2019-07-31 PROCEDURE — 36415 COLL VENOUS BLD VENIPUNCTURE: CPT

## 2019-07-31 PROCEDURE — 85025 COMPLETE CBC W/AUTO DIFF WBC: CPT

## 2019-07-31 PROCEDURE — 84484 ASSAY OF TROPONIN QUANT: CPT

## 2019-07-31 PROCEDURE — 93005 ELECTROCARDIOGRAM TRACING: CPT | Performed by: EMERGENCY MEDICINE

## 2019-07-31 PROCEDURE — 81003 URINALYSIS AUTO W/O SCOPE: CPT

## 2019-07-31 RX ORDER — SIMVASTATIN 40 MG
40 TABLET ORAL NIGHTLY
Status: SHIPPED | COMMUNITY
End: 2020-10-12 | Stop reason: SDUPTHER

## 2019-07-31 SDOH — HEALTH STABILITY: MENTAL HEALTH: HOW MANY STANDARD DRINKS CONTAINING ALCOHOL DO YOU HAVE ON A TYPICAL DAY?: 1 OR 2

## 2019-07-31 SDOH — HEALTH STABILITY: MENTAL HEALTH: HOW OFTEN DO YOU HAVE A DRINK CONTAINING ALCOHOL?: 4 OR MORE TIMES A WEEK

## 2019-07-31 NOTE — ED PROVIDER NOTES
ED Provider Note    CHIEF COMPLAINT  Chief Complaint   Patient presents with   • Lightheadedness     coming and going all morning.        HPI  Miguel Carranza is a 69 y.o. male who presents having dizzy spells today described as 30 seconds of lightheaded sensation happening approximately twice an hour.  Currently he is asymptomatic at rest lying flat.  He denies changes in bowel habits, stating he has chronically loose stool and takes a fiber laxative to help bulk this.  No bloody stool, no emesis.  No chest pain or palpitations, no shortness of breath.  He denies vertigo.  No acute numbness or weakness, no headache.  He states he has not had similar feelings of lightheadedness like this in the past.  No new chills or sweats.    REVIEW OF SYSTEMS    Constitutional: Dizziness, no fever  Respiratory: No shortness of breath or pleurisy  Cardiac: No chest pain, no syncope  Gastrointestinal: No abdominal pain, nausea or vomiting  Musculoskeletal: No acute neck or back pain  Neurologic: No vertigo, no numbness or weakness         All other systems are negative.       PAST MEDICAL HISTORY  Past Medical History:   Diagnosis Date   • Acute pain of left knee 11/1/2016   • Cancer (HCC) 01/15    Prostate   • Cold 01/04/15    chest congestion, afebrile,sore throat   • Dental disorder     Partial upper   • High cholesterol    • Other specified disorder of intestines     Diarrhea   • Snoring     No sleep study       FAMILY HISTORY  Family History   Problem Relation Age of Onset   • Stroke Mother    • Heart Disease Father        SOCIAL HISTORY  Social History     Socioeconomic History   • Marital status:      Spouse name: Not on file   • Number of children: Not on file   • Years of education: Not on file   • Highest education level: Not on file   Occupational History   • Not on file   Social Needs   • Financial resource strain: Not on file   • Food insecurity:     Worry: Not on file     Inability: Not on file   •  Transportation needs:     Medical: Not on file     Non-medical: Not on file   Tobacco Use   • Smoking status: Current Every Day Smoker     Packs/day: 0.50     Years: 40.00     Pack years: 20.00     Types: Cigarettes, Cigars     Last attempt to quit: 2014     Years since quittin.7   • Smokeless tobacco: Never Used   Substance and Sexual Activity   • Alcohol use: Yes     Alcohol/week: 0.0 oz     Frequency: 4 or more times a week     Drinks per session: 1 or 2   • Drug use: No   • Sexual activity: Yes     Partners: Female     Comment: previous history of gonorrhea when stationed in [place]   Lifestyle   • Physical activity:     Days per week: Not on file     Minutes per session: Not on file   • Stress: Not on file   Relationships   • Social connections:     Talks on phone: Not on file     Gets together: Not on file     Attends Sabianism service: Not on file     Active member of club or organization: Not on file     Attends meetings of clubs or organizations: Not on file     Relationship status: Not on file   • Intimate partner violence:     Fear of current or ex partner: Not on file     Emotionally abused: Not on file     Physically abused: Not on file     Forced sexual activity: Not on file   Other Topics Concern   • Not on file   Social History Narrative   • Not on file       SURGICAL HISTORY  Past Surgical History:   Procedure Laterality Date   • INGUINAL HERNIA REPAIR  2018    rt side   • PROSTATECTOMY ROBOTIC XI  2015    Performed by Brandyn Brooke M.D. at SURGERY [facility]   • VASECTOMY  1990s       CURRENT MEDICATIONS  Home Medications     Reviewed by Amrik Bowden (Pharmacy Tech) on 19 at 1241  Med List Status: Complete   Medication Last Dose Status   Cholecalciferol (VITAMIN D) 2000 UNIT TABS 2019 Active   sertraline (ZOLOFT) 50 MG Tab 2019 Active   simvastatin (ZOCOR) 40 MG Tab 2019 Active   therapeutic multivitamin-minerals (THERAGRAN-M) TABS  Active     "            ALLERGIES  No Known Allergies    PHYSICAL EXAM  VITAL SIGNS: /87   Pulse 69   Temp 36.3 °C (97.4 °F) (Temporal)   Resp 16   Ht 1.626 m (5' 4\")   Wt 73.8 kg (162 lb 11.2 oz)   SpO2 94%   BMI 27.93 kg/m²   Constitutional:  Non-toxic appearance.   HENT: No facial swelling  Eyes: Anicteric, no conjunctivitis.     Cardiovascular: Normal heart rate, Normal rhythm  Pulmonary:  No wheezing, No rales.   Gastrointestinal: Soft, No tenderness, No masses  Skin: Warm, Dry, No cyanosis.   Neurologic: Speech is clear, follows commands, facial expression is symmetrical.  Psychiatric: Affect normal,  Mood normal.  Patient is calm and cooperative  Musculoskeletal: Chest wall nontender, neck nontender    EKG/Labs  Results for orders placed or performed during the hospital encounter of 07/31/19   CBC WITH DIFFERENTIAL   Result Value Ref Range    WBC 8.8 4.8 - 10.8 K/uL    RBC 4.79 4.70 - 6.10 M/uL    Hemoglobin 14.5 14.0 - 18.0 g/dL    Hematocrit 42.1 42.0 - 52.0 %    MCV 87.9 81.4 - 97.8 fL    MCH 30.3 27.0 - 33.0 pg    MCHC 34.4 33.7 - 35.3 g/dL    RDW 38.9 35.9 - 50.0 fL    Platelet Count 207 164 - 446 K/uL    MPV 9.5 9.0 - 12.9 fL    Neutrophils-Polys 75.70 (H) 44.00 - 72.00 %    Lymphocytes 14.30 (L) 22.00 - 41.00 %    Monocytes 8.30 0.00 - 13.40 %    Eosinophils 1.20 0.00 - 6.90 %    Basophils 0.20 0.00 - 1.80 %    Immature Granulocytes 0.30 0.00 - 0.90 %    Nucleated RBC 0.00 /100 WBC    Neutrophils (Absolute) 6.67 1.82 - 7.42 K/uL    Lymphs (Absolute) 1.26 1.00 - 4.80 K/uL    Monos (Absolute) 0.73 0.00 - 0.85 K/uL    Eos (Absolute) 0.11 0.00 - 0.51 K/uL    Baso (Absolute) 0.02 0.00 - 0.12 K/uL    Immature Granulocytes (abs) 0.03 0.00 - 0.11 K/uL    NRBC (Absolute) 0.00 K/uL   COMP METABOLIC PANEL   Result Value Ref Range    Sodium 136 135 - 145 mmol/L    Potassium 3.8 3.6 - 5.5 mmol/L    Chloride 104 96 - 112 mmol/L    Co2 22 20 - 33 mmol/L    Anion Gap 10.0 0.0 - 11.9    Glucose 88 65 - 99 mg/dL    Bun " 16 8 - 22 mg/dL    Creatinine 1.17 0.50 - 1.40 mg/dL    Calcium 9.1 8.4 - 10.2 mg/dL    AST(SGOT) 19 12 - 45 U/L    ALT(SGPT) 24 2 - 50 U/L    Alkaline Phosphatase 68 30 - 99 U/L    Total Bilirubin 0.9 0.1 - 1.5 mg/dL    Albumin 4.4 3.2 - 4.9 g/dL    Total Protein 7.2 6.0 - 8.2 g/dL    Globulin 2.8 1.9 - 3.5 g/dL    A-G Ratio 1.6 g/dL   TROPONIN   Result Value Ref Range    Troponin T 6 6 - 19 ng/L   URINALYSIS CULTURE, IF INDICATED   Result Value Ref Range    Color Yellow     Character Clear     Specific Gravity <=1.005 <1.035    Ph 5.5 5.0 - 8.0    Glucose Negative Negative mg/dL    Ketones Negative Negative mg/dL    Protein Negative Negative mg/dL    Bilirubin Negative Negative    Nitrite Negative Negative    Leukocyte Esterase Negative Negative    Occult Blood Negative Negative    Micro Urine Req see below    ESTIMATED GFR   Result Value Ref Range    GFR If African American >60 >60 mL/min/1.73 m 2    GFR If Non African American >60 >60 mL/min/1.73 m 2   EKG (NOW)   Result Value Ref Range    Report       [facility] Emergency Dept.    Test Date:  2019  Pt Name:    NIMA FREDERICK                Department: [facility]  MRN:        [id number]                      [facility]  Gender:     Male                         Technician: NAKUL  :        1950                   Requested By:ROLDAN REID  Order #:    [id number]                    Reading MD:    Measurements  Intervals                                Axis  Rate:       60                           P:          32  WY:         126                          QRS:        14  QRSD:       98                           T:          16  QT:         393  QTc:        393    Interpretive Statements  Sinus rhythm  Compared to ECG 2015 17:16:18  Sinus bradycardia no longer present               COURSE & MEDICAL DECISION MAKING  Pertinent Labs & Imaging studies reviewed. (See chart for details)  Negative EKG, negative troponin,  lightheadedness does not appear to be related to arrhythmia as he was stable on a cardiac monitor.  Etiology of his dizzy spells is unknown.  Possible early viral infection.  There is no anemia.  His vital signs are normal.  Vertigo is possible, he is advised to follow-up with your nose throat doctor for reevaluation, he also states he has hearing difficulties which is chronic.  Patient is advised to return the emergency department if symptoms worsen.  No evidence of GI bleed or anemia.  Patient is discharged in good condition to the care of his wife    FINAL IMPRESSION     1. Lightheadedness                     Electronically signed by: Ed Hills, 7/31/2019 12:52 PM

## 2019-07-31 NOTE — DISCHARGE INSTRUCTIONS
Please follow-up with your nose throat doctor soon as possible for evaluation of hearing and dizzy spells.      Return to the emergency department if worse or for any concerns.

## 2019-07-31 NOTE — ED TRIAGE NOTES
"Chief Complaint   Patient presents with   • Lightheadedness     coming and going all morning.      No neuro deficits noted.   Pt ambulatory.   /87   Pulse 69   Temp 36.3 °C (97.4 °F) (Temporal)   Resp 16   Ht 1.626 m (5' 4\")   Wt 73.8 kg (162 lb 11.2 oz)   SpO2 94%   Pt informed of wait times. Educated on triage process.  Asked to return to triage RN for any new or worsening of symptoms. Thanked for patience.        "

## 2019-07-31 NOTE — ED NOTES
Medication Reconciliation updated and complete per pt & pt family at bedside  Allergies have been verified   No oral ABX within the last 14 days  Pt Home Pharmacy:[facility]

## 2019-08-13 ENCOUNTER — OFFICE VISIT (OUTPATIENT)
Dept: MEDICAL GROUP | Facility: PHYSICIAN GROUP | Age: 69
End: 2019-08-13
Payer: MEDICARE

## 2019-08-13 VITALS
TEMPERATURE: 98.8 F | SYSTOLIC BLOOD PRESSURE: 130 MMHG | WEIGHT: 160 LBS | BODY MASS INDEX: 27.31 KG/M2 | RESPIRATION RATE: 16 BRPM | HEART RATE: 91 BPM | DIASTOLIC BLOOD PRESSURE: 78 MMHG | OXYGEN SATURATION: 95 % | HEIGHT: 64 IN

## 2019-08-13 DIAGNOSIS — R42 VERTIGO: ICD-10-CM

## 2019-08-13 DIAGNOSIS — F17.200 SMOKER: ICD-10-CM

## 2019-08-13 PROBLEM — Z76.89 ENCOUNTER TO ESTABLISH CARE WITH NEW DOCTOR: Status: RESOLVED | Noted: 2018-04-17 | Resolved: 2019-08-13

## 2019-08-13 PROCEDURE — 99214 OFFICE O/P EST MOD 30 MIN: CPT | Performed by: NURSE PRACTITIONER

## 2019-08-13 SDOH — HEALTH STABILITY: MENTAL HEALTH: HOW OFTEN DO YOU HAVE A DRINK CONTAINING ALCOHOL?: NOT ASKED

## 2019-08-13 SDOH — HEALTH STABILITY: MENTAL HEALTH: HOW MANY STANDARD DRINKS CONTAINING ALCOHOL DO YOU HAVE ON A TYPICAL DAY?: NOT ASKED

## 2019-08-13 ASSESSMENT — PATIENT HEALTH QUESTIONNAIRE - PHQ9: CLINICAL INTERPRETATION OF PHQ2 SCORE: 0

## 2019-08-13 NOTE — PROGRESS NOTES
Chief Complaint   Patient presents with   • Hospital Follow-up       HISTORY OF PRESENT ILLNESS: Patient is a 69 y.o. male established patient who presents today to discuss the following issues:    Vertigo  Was recently seen in the ER for lightheadedness.  His work-up was negative.  He would like a referral to ENT for further evaluation.    Smoker  Would like to try Chantix again.  He has tolerated it well in the past.      Patient Active Problem List    Diagnosis Date Noted   • Vertigo 2019   • Smoker 2019   • Elevated glucose 2016   • Hyperlipidemia 2016   • Erectile dysfunction 2016   • Prostate cancer (HCC) 2015       Allergies:Patient has no known allergies.    Current Outpatient Medications   Medication Sig Dispense Refill   • Psyllium (METAMUCIL FIBER PO) Take  by mouth.     • varenicline (CHANTIX STARTING MONTH ) 0.5 MG X 11 & 1 MG X 42 tablet Take as directed. 56 Tab 3   • sertraline (ZOLOFT) 50 MG Tab Take 50 mg by mouth every day.     • simvastatin (ZOCOR) 40 MG Tab Take 40 mg by mouth every evening.     • Cholecalciferol (VITAMIN D) 2000 UNIT TABS Take 2,000 Units by mouth every evening.     • therapeutic multivitamin-minerals (THERAGRAN-M) TABS Take 1 Tab by mouth every day.       No current facility-administered medications for this visit.        Social History     Tobacco Use   • Smoking status: Current Every Day Smoker     Years: 40.00     Types: Cigarettes     Last attempt to quit: 2014     Years since quittin.8   • Smokeless tobacco: Never Used   • Tobacco comment: a pack a week    Substance Use Topics   • Alcohol use: Yes     Alcohol/week: 0.6 oz     Types: 1 Cans of beer per week   • Drug use: No       Family Status   Relation Name Status   • Mo     • Fa     • Sis  Alive   • Bro  Alive   • Sis  Alive     Family History   Problem Relation Age of Onset   • Stroke Mother    • Heart Disease Father        Review of Systems:  "  Constitutional: Negative for fever, chills, weight loss and malaise/fatigue.   HENT: Negative for ear pain, nosebleeds, congestion, sore throat and neck pain.  Positive for vertigo.  Eyes: Negative for blurred vision.   Respiratory: Negative for cough, sputum production, shortness of breath and wheezing.    Cardiovascular: Negative for chest pain, palpitations, orthopnea and leg swelling.   Gastrointestinal: Negative for heartburn, nausea, vomiting and abdominal pain.   Genitourinary: Negative for dysuria, urgency and frequency.   Musculoskeletal: Negative for myalgias, joint pain, and back pain.  Skin: Negative for rash and itching.   Neurological: Negative for tingling, tremors, sensory change, focal weakness and headaches.   Endo/Heme/Allergies: Does not bruise/bleed easily.   Psychiatric/Behavioral: Negative for depression, suicidal ideas and memory loss.  The patient is not nervous/anxious and does not have insomnia.    All other systems reviewed and are negative except as in HPI.    Exam:  /78   Pulse 91   Temp 37.1 °C (98.8 °F)   Resp 16   Ht 1.626 m (5' 4\")   Wt 72.6 kg (160 lb)   SpO2 95%   General:  Well nourished, well developed male in NAD  Head: Grossly normal.  Neck: Supple without JVD or bruit. Thyroid is not enlarged.  Pulmonary: Clear to ausculation. Normal effort. No rales, ronchi, or wheezing.  Cardiovascular: Regular rate and rhythm without murmur.   Abdomen:  Soft, nontender, nondistended.  Extremities: No clubbing, cyanosis, or edema.  Skin: Intact with no obvious rashes or lesions.  Neuro: Grossly intact.  Psych: Alert and oriented x 3.  Mood and affect appropriate.    Medical decision-making and discussion: Miguel is here today for follow-up.  His records were reviewed, a referral was sent to ENT, and he was given a prescription for Chantix.  He will follow-up here as needed.          Assessment/Plan:  1. Vertigo  REFERRAL TO ENT   2. Smoker  varenicline (CHANTIX STARTING MONTH " NICK) 0.5 MG X 11 & 1 MG X 42 tablet       Return if symptoms worsen or fail to improve.    Please note that this dictation was created using voice recognition software. I have made every reasonable attempt to correct obvious errors, but I expect that there are errors of grammar and possibly content that I did not discover before finalizing the note.

## 2019-08-14 ENCOUNTER — TELEPHONE (OUTPATIENT)
Dept: MEDICAL GROUP | Facility: PHYSICIAN GROUP | Age: 69
End: 2019-08-14

## 2019-08-14 NOTE — TELEPHONE ENCOUNTER
Generic therapeutic alternatives for Chantix Starting Month Tito are available.   These first-line therapies may not require a prior authorization.  · buPROPion HCl ER (Smoking Det)   9%  · Nicotine   22%  · Nicotine Polacrilex   31%

## 2019-08-14 NOTE — TELEPHONE ENCOUNTER
DOCUMENTATION OF PAR STATUS:    1. Name of Medication & Dose: Chantix     2. Name of Prescription Coverage Company & phone #: medicare    3. Date Prior Auth Submitted: 08/14/19    4. What information was given to obtain insurance decision? Cover My Meds    5. Prior Auth Status? Pending    6. Patient Notified: yes

## 2019-08-19 PROBLEM — R42 VERTIGO: Status: ACTIVE | Noted: 2019-08-19

## 2019-08-19 PROBLEM — F17.200 SMOKER: Status: ACTIVE | Noted: 2019-08-19

## 2019-08-19 NOTE — ASSESSMENT & PLAN NOTE
Was recently seen in the ER for lightheadedness.  His work-up was negative.  He would like a referral to ENT for further evaluation.

## 2019-10-26 DIAGNOSIS — F32.1 MODERATE SINGLE CURRENT EPISODE OF MAJOR DEPRESSIVE DISORDER (HCC): ICD-10-CM

## 2020-04-29 DIAGNOSIS — F32.1 MODERATE SINGLE CURRENT EPISODE OF MAJOR DEPRESSIVE DISORDER (HCC): ICD-10-CM

## 2020-04-29 NOTE — TELEPHONE ENCOUNTER
Was the patient seen in the last year in this department? Yes    Does patient have an active prescription for medications requested? No     Received Request Via: Pharmacy      Pt met protocol?: Yes, OV 8/19

## 2020-09-10 ENCOUNTER — OFFICE VISIT (OUTPATIENT)
Dept: MEDICAL GROUP | Facility: PHYSICIAN GROUP | Age: 70
End: 2020-09-10
Payer: MEDICARE

## 2020-09-10 VITALS
BODY MASS INDEX: 28.88 KG/M2 | HEART RATE: 89 BPM | SYSTOLIC BLOOD PRESSURE: 126 MMHG | DIASTOLIC BLOOD PRESSURE: 78 MMHG | HEIGHT: 63 IN | TEMPERATURE: 96.9 F | OXYGEN SATURATION: 97 % | RESPIRATION RATE: 12 BRPM | WEIGHT: 163 LBS

## 2020-09-10 DIAGNOSIS — Z11.59 NEED FOR HEPATITIS C SCREENING TEST: ICD-10-CM

## 2020-09-10 DIAGNOSIS — E55.9 VITAMIN D DEFICIENCY: ICD-10-CM

## 2020-09-10 DIAGNOSIS — L82.1 SEBORRHEIC KERATOSES: ICD-10-CM

## 2020-09-10 DIAGNOSIS — Z00.00 GENERAL MEDICAL EXAM: ICD-10-CM

## 2020-09-10 DIAGNOSIS — Z23 NEED FOR VACCINATION: ICD-10-CM

## 2020-09-10 DIAGNOSIS — R53.83 FATIGUE, UNSPECIFIED TYPE: ICD-10-CM

## 2020-09-10 DIAGNOSIS — F32.0 CURRENT MILD EPISODE OF MAJOR DEPRESSIVE DISORDER, UNSPECIFIED WHETHER RECURRENT (HCC): ICD-10-CM

## 2020-09-10 DIAGNOSIS — E78.49 OTHER HYPERLIPIDEMIA: ICD-10-CM

## 2020-09-10 DIAGNOSIS — C61 PROSTATE CANCER (HCC): ICD-10-CM

## 2020-09-10 PROCEDURE — G0009 ADMIN PNEUMOCOCCAL VACCINE: HCPCS | Performed by: PHYSICIAN ASSISTANT

## 2020-09-10 PROCEDURE — 90715 TDAP VACCINE 7 YRS/> IM: CPT | Performed by: PHYSICIAN ASSISTANT

## 2020-09-10 PROCEDURE — 17110 DESTRUCTION B9 LES UP TO 14: CPT | Performed by: PHYSICIAN ASSISTANT

## 2020-09-10 PROCEDURE — 90472 IMMUNIZATION ADMIN EACH ADD: CPT | Performed by: PHYSICIAN ASSISTANT

## 2020-09-10 PROCEDURE — 90732 PPSV23 VACC 2 YRS+ SUBQ/IM: CPT | Performed by: PHYSICIAN ASSISTANT

## 2020-09-10 PROCEDURE — 99214 OFFICE O/P EST MOD 30 MIN: CPT | Mod: 25 | Performed by: PHYSICIAN ASSISTANT

## 2020-09-10 ASSESSMENT — PATIENT HEALTH QUESTIONNAIRE - PHQ9
SUM OF ALL RESPONSES TO PHQ9 QUESTIONS 1 AND 2: 1
7. TROUBLE CONCENTRATING ON THINGS, SUCH AS READING THE NEWSPAPER OR WATCHING TELEVISION: NOT AT ALL
CLINICAL INTERPRETATION OF PHQ2 SCORE: 0
2. FEELING DOWN, DEPRESSED, IRRITABLE, OR HOPELESS: NOT AT ALL
4. FEELING TIRED OR HAVING LITTLE ENERGY: MORE THAN HALF THE DAYS
1. LITTLE INTEREST OR PLEASURE IN DOING THINGS: SEVERAL DAYS
3. TROUBLE FALLING OR STAYING ASLEEP OR SLEEPING TOO MUCH: NOT AT ALL
SUM OF ALL RESPONSES TO PHQ QUESTIONS 1-9: 3
8. MOVING OR SPEAKING SO SLOWLY THAT OTHER PEOPLE COULD HAVE NOTICED. OR THE OPPOSITE, BEING SO FIGETY OR RESTLESS THAT YOU HAVE BEEN MOVING AROUND A LOT MORE THAN USUAL: NOT AT ALL
5. POOR APPETITE OR OVEREATING: NOT AT ALL
6. FEELING BAD ABOUT YOURSELF - OR THAT YOU ARE A FAILURE OR HAVE LET YOURSELF OR YOUR FAMILY DOWN: NOT AL ALL
9. THOUGHTS THAT YOU WOULD BE BETTER OFF DEAD, OR OF HURTING YOURSELF: NOT AT ALL

## 2020-09-10 ASSESSMENT — FIBROSIS 4 INDEX: FIB4 SCORE: 1.31

## 2020-09-10 NOTE — LETTER
[facility]  Felicia Mabry P.A.-C.  [address]  [place] [address]  Fax: [phone]   Authorization for Release/Disclosure of   Protected Health Information   Name: MIGUEL CARRANZA : 1950 SSN: [id number]   Address:  [place] [address] Phone:    [phone] (home)    I authorize the entity listed below to release/disclose the PHI below to:   [facility]/Felicia Mabry P.A.-C. and Felicia Mabry P.A.-C.   Provider or Entity Name:  [facility]   Address   City, State, [facility]   Phone:      Fax:  [phone]   Reason for request: Continuity Of Care   Information to be released:    [X] LAST COLONOSCOPY,  including any PATH REPORT and follow-up  [  ] LAST FIT/COLOGUARD RESULT [  ] LAST DEXA  [  ] LAST MAMMOGRAM  [  ] LAST PAP  [  ] LAST LABS [  ] RETINA EXAM REPORT  [  ] IMMUNIZATION RECORDS  [  ] Release all info          DATES OF SERVICE OR TIME PERIOD TO BE DISCLOSED: _____________  I understand and acknowledge that:  * This Authorization may be revoked at any time by you in writing, except if your health information has already been used or disclosed.  * Your health information that will be used or disclosed as a result of you signing this authorization could be re-disclosed by the recipient. If this occurs, your re-disclosed health information may no longer be protected by State or Federal laws.  * You may refuse to sign this Authorization. Your refusal will not affect your ability to obtain treatment.  * This Authorization becomes effective upon signing and will  on (date) __________.      If no date is indicated, this Authorization will  one (1) year from the signature date.    Name: Miguel Carranza    Signature: Continuity Of Care   Date:     9/10/2020       PLEASE FAX REQUESTED RECORDS BACK TO: [phone]

## 2020-09-10 NOTE — ASSESSMENT & PLAN NOTE
Chronic condition, controlled with zoloft 50 mg. No SI/HI.     Patients PHQ9 score was 3 today. Minimal depression.

## 2020-09-10 NOTE — ASSESSMENT & PLAN NOTE
History of prostate cancer, s/p prostatectomy 2015. Diagnosed in 2014. Radiation a few later. Gets PSA through [facility], gets q 6 months. Dr. Romero I Oncologist.

## 2020-09-10 NOTE — ASSESSMENT & PLAN NOTE
This is a chronic condition.   Latest Labs:   Lab Results   Component Value Date/Time    CHOLSTRLTOT 200 (H) 10/27/2017 07:42 AM     (H) 10/27/2017 07:42 AM    HDL 48 10/27/2017 07:42 AM    TRIGLYCERIDE 186 (H) 10/27/2017 07:42 AM      Medications: simvastatin 40 mg  Medication side effects: none  Diet/Exercise: just retired in June, working progress with diet and exercise.     Risk calculator: The 10-year ASCVD risk score (Seabrook RACHAEL Luna., et al., 2013) is: 21.7%

## 2020-09-10 NOTE — ASSESSMENT & PLAN NOTE
Patient complains of a dry scaly lesion over right chest.  Sometimes itchy.  Very bothersome to him.  He would like it removed.

## 2020-09-10 NOTE — PROGRESS NOTES
CC:   Chief Complaint   Patient presents with   • Establish Care   • Depression          HISTORY OF PRESENT ILLNESS: Patient is a 70 y.o. male established patient who presents today to establish care with me and discuss the following issues:     Health Maintenance: Completed  Update tdap and pnuemonia shots today.   Colonoscopy at [facility]- will request records.   Had chickenpox as child, print Shingrix.   Hep C screen- he believes he's had this.     Hyperlipidemia  This is a chronic condition.   Latest Labs:   Lab Results   Component Value Date/Time    CHOLSTRLTOT 200 (H) 10/27/2017 07:42 AM     (H) 10/27/2017 07:42 AM    HDL 48 10/27/2017 07:42 AM    TRIGLYCERIDE 186 (H) 10/27/2017 07:42 AM      Medications: simvastatin 40 mg  Medication side effects: none  Diet/Exercise: just retired in June, working progress with diet and exercise.     Risk calculator: The 10-year ASCVD risk score (Mattheweze CANO Jr., et al., 2013) is: 21.7%       Major depressive disorder  Chronic condition, controlled with zoloft 50 mg. No SI/HI.     Patients PHQ9 score was 3 today. Minimal depression.     Prostate cancer (HCC)  History of prostate cancer, s/p prostatectomy 2015. Diagnosed in 2014. Radiation a few later. Gets PSA through [facility], gets q 6 months. Dr. Nick HULL Oncologist.     Seborrheic keratoses  Patient complains of a dry scaly lesion over right chest.  Sometimes itchy.  Very bothersome to him.  He would like it removed.    Fatigue  Patient mentions that he has some generalized fatigue.  This has been since he retired in June.  He thinks it could be an activity, may be some boredom.  He is trying to get into an exercise routine but feels limited due to the pandemic.  Feels like his depression is well controlled and this is not a contributing factor.  Getting baseline labs and will add testosterone levels.      Patient Active Problem List    Diagnosis Date Noted   • Vitamin D deficiency 09/10/2020   • Seborrheic keratoses  09/10/2020   • Fatigue 09/10/2020   • Vertigo 2019   • Smoker 2019   • Elevated glucose 2016   • Hyperlipidemia 2016   • Major depressive disorder 2016   • Erectile dysfunction 2016   • Prostate cancer (HCC) 2015      Allergies:Patient has no known allergies.    Current Outpatient Medications   Medication Sig Dispense Refill   • Zoster Vac Recomb Adjuvanted (SHINGRIX) 50 MCG/0.5ML Recon Susp 0.5 mL by Intramuscular route Once for 1 dose. 0.5 mL 0   • sertraline (ZOLOFT) 50 MG Tab Take 1 Tab by mouth every day. 90 Tab 1   • Psyllium (METAMUCIL FIBER PO) Take  by mouth.     • simvastatin (ZOCOR) 40 MG Tab Take 40 mg by mouth every evening.     • Cholecalciferol (VITAMIN D) 2000 UNIT TABS Take 2,000 Units by mouth every evening.     • therapeutic multivitamin-minerals (THERAGRAN-M) TABS Take 1 Tab by mouth every day.       No current facility-administered medications for this visit.        Social History     Tobacco Use   • Smoking status: Current Every Day Smoker     Years: 40.00     Types: Cigarettes     Last attempt to quit: 2014     Years since quittin.8   • Smokeless tobacco: Never Used   • Tobacco comment: a pack a week    Substance Use Topics   • Alcohol use: Yes     Alcohol/week: 0.6 oz     Types: 1 Cans of beer per week   • Drug use: No     Social History     Social History Narrative   • Not on file       Family History   Problem Relation Age of Onset   • Stroke Mother    • Heart Disease Father        Review of Systems:    Constitutional: No Fevers, Chills, complains of some generalized fatigue, see HPI.   Eyes: No vision changes  ENT: No hearing changes  Resp: No Shortness of breath  CV: No Chest pain  GI: No Nausea/Vomiting  MSK: No weakness  Skin: No rashes,  C/o skin lesion, see HPI.   Neuro: No Headaches  Psych: No Suicidal ideations    All remaining systems reviewed and found to be negative, except as stated above.    Exam:    /78   Pulse 89    "Temp 36.1 °C (96.9 °F) (Temporal)   Resp 12   Ht 1.6 m (5' 3\")   Wt 73.9 kg (163 lb)   SpO2 97%  Body mass index is 28.87 kg/m².    General:  Well nourished, well developed male in NAD  HENT: Atraumatic, normocephalic  EYES: Extraocular movements intact, PERRLA.   NECK: Supple, FROM  CHEST: No deformities, Equal chest expansion  RESP: Unlabored, no stridor or audible wheeze  HEART: Regular Rate and rhythm.   ABD: Soft, Nontender, Non-Distended  Extremities: No Clubbing, Cyanosis, or Edema  Skin: Warm/dry, without rashes, approximately 3 mm scaling verrucous lesion over right anterior chest.  Neuro: A/O x 4, due to COVID-19- did not have patient remove face mask to test cranial nerves.  Motor/sensory grossly intact  Psych: Normal behavior, normal affect      Lab review:  Labs are reviewed and discussed with a patient  Lab Results   Component Value Date/Time    CHOLSTRLTOT 200 (H) 10/27/2017 07:42 AM     (H) 10/27/2017 07:42 AM    HDL 48 10/27/2017 07:42 AM    TRIGLYCERIDE 186 (H) 10/27/2017 07:42 AM       Lab Results   Component Value Date/Time    SODIUM 136 07/31/2019 01:07 PM    POTASSIUM 3.8 07/31/2019 01:07 PM    CHLORIDE 104 07/31/2019 01:07 PM    CO2 22 07/31/2019 01:07 PM    GLUCOSE 88 07/31/2019 01:07 PM    BUN 16 07/31/2019 01:07 PM    CREATININE 1.17 07/31/2019 01:07 PM    BUNCREATRAT 18 10/27/2017 07:42 AM     Lab Results   Component Value Date/Time    ALKPHOSPHAT 68 07/31/2019 01:07 PM    ASTSGOT 19 07/31/2019 01:07 PM    ALTSGPT 24 07/31/2019 01:07 PM    TBILIRUBIN 0.9 07/31/2019 01:07 PM      No results found for: HBA1C  Lab Results   Component Value Date/Time    TSH 2.650 02/19/2018 04:13 PM     No results found for: FREET4    Lab Results   Component Value Date/Time    WBC 8.8 07/31/2019 01:07 PM    RBC 4.79 07/31/2019 01:07 PM    HEMOGLOBIN 14.5 07/31/2019 01:07 PM    HEMATOCRIT 42.1 07/31/2019 01:07 PM    MCV 87.9 07/31/2019 01:07 PM    MCH 30.3 07/31/2019 01:07 PM    MCHC 34.4 07/31/2019 " 01:07 PM    MPV 9.5 2019 01:07 PM    NEUTSPOLYS 75.70 (H) 2019 01:07 PM    LYMPHOCYTES 14.30 (L) 2019 01:07 PM    MONOCYTES 8.30 2019 01:07 PM    EOSINOPHILS 1.20 2019 01:07 PM    BASOPHILS 0.20 2019 01:07 PM        Assessment/Plan:  1. Need for vaccination  - Tdap Vaccine =>8YO IM  - Pneumovax Vaccine (PPSV23)  - Zoster Vac Recomb Adjuvanted (SHINGRIX) 50 MCG/0.5ML Recon Susp; 0.5 mL by Intramuscular route Once for 1 dose.  Dispense: 0.5 mL; Refill: 0    2. General medical exam  - CBC WITH DIFFERENTIAL; Future  - Comp Metabolic Panel; Future  - TSH; Future    3. Other hyperlipidemia  - Lipid Profile; Future  Chronic condition.  On simvastatin 40 mg.  I do not see lipid panels, since 2017.  Update now.    4. Vitamin D deficiency  - VITAMIN D,25 HYDROXY; Future  Chronic condition, last Vit D very borderline, continue supplemenation  5. Current mild episode of major depressive disorder, unspecified whether recurrent (HCC)  Chronic condition, stable on sertraline 50 mg   6. Prostate cancer (HCC)  Followed by Dr. Brooke.   7. Fatigue, unspecified type  - TESTOSTERONE F&T MALE ADULT; Future    8. Need for hepatitis C screening test  - HCV Scrn ( [id number] 1xLife); Future    9. Seborrheic keratoses   Procedure note: Areas described above on skin were treated with liquid nitrogen. Canister was held in upright position with cryo tip nozzle approximately 1 cm away from the lesion before the trigger was pressed to activate flow of Liquid Nitrogen. Liquid nitrogen was applied for 10-12 seconds to the skin lesion and the expected blistering or scabbing reaction explained. Patient reminded not pick at the area and to expect hypopigmented scars from the procedure. Return if lesion fails to fully resolve.       Follow-up: Return in about 4 weeks (around 10/8/2020) for F/U, sooner pending labs..    Please note that this dictation was created using voice recognition software. I have made  every reasonable attempt to correct obvious errors, but I expect that there are errors of grammar and possibly content that I did not discover before finalizing the note.

## 2020-09-10 NOTE — ASSESSMENT & PLAN NOTE
Patient mentions that he has some generalized fatigue.  This has been since he retired in June.  He thinks it could be an activity, may be some boredom.  He is trying to get into an exercise routine but feels limited due to the pandemic.  Feels like his depression is well controlled and this is not a contributing factor.  Getting baseline labs and will add testosterone levels.

## 2020-10-06 ENCOUNTER — HOSPITAL ENCOUNTER (OUTPATIENT)
Dept: LAB | Facility: MEDICAL CENTER | Age: 70
End: 2020-10-06
Attending: PHYSICIAN ASSISTANT
Payer: MEDICARE

## 2020-10-06 DIAGNOSIS — Z11.59 NEED FOR HEPATITIS C SCREENING TEST: ICD-10-CM

## 2020-10-06 DIAGNOSIS — E55.9 VITAMIN D DEFICIENCY: ICD-10-CM

## 2020-10-06 DIAGNOSIS — Z00.00 GENERAL MEDICAL EXAM: ICD-10-CM

## 2020-10-06 DIAGNOSIS — E78.49 OTHER HYPERLIPIDEMIA: ICD-10-CM

## 2020-10-06 DIAGNOSIS — R53.83 FATIGUE, UNSPECIFIED TYPE: ICD-10-CM

## 2020-10-06 LAB
25(OH)D3 SERPL-MCNC: 35 NG/ML (ref 30–100)
ALBUMIN SERPL BCP-MCNC: 4.2 G/DL (ref 3.2–4.9)
ALBUMIN/GLOB SERPL: 1.7 G/DL
ALP SERPL-CCNC: 78 U/L (ref 30–99)
ALT SERPL-CCNC: 28 U/L (ref 2–50)
ANION GAP SERPL CALC-SCNC: 12 MMOL/L (ref 7–16)
AST SERPL-CCNC: 21 U/L (ref 12–45)
BASOPHILS # BLD AUTO: 0.3 % (ref 0–1.8)
BASOPHILS # BLD: 0.03 K/UL (ref 0–0.12)
BILIRUB SERPL-MCNC: 0.4 MG/DL (ref 0.1–1.5)
BUN SERPL-MCNC: 18 MG/DL (ref 8–22)
CALCIUM SERPL-MCNC: 9.2 MG/DL (ref 8.5–10.5)
CHLORIDE SERPL-SCNC: 102 MMOL/L (ref 96–112)
CHOLEST SERPL-MCNC: 216 MG/DL (ref 100–199)
CO2 SERPL-SCNC: 23 MMOL/L (ref 20–33)
CREAT SERPL-MCNC: 0.99 MG/DL (ref 0.5–1.4)
EOSINOPHIL # BLD AUTO: 0.19 K/UL (ref 0–0.51)
EOSINOPHIL NFR BLD: 1.9 % (ref 0–6.9)
ERYTHROCYTE [DISTWIDTH] IN BLOOD BY AUTOMATED COUNT: 42.5 FL (ref 35.9–50)
FASTING STATUS PATIENT QL REPORTED: NORMAL
GLOBULIN SER CALC-MCNC: 2.5 G/DL (ref 1.9–3.5)
GLUCOSE SERPL-MCNC: 103 MG/DL (ref 65–99)
HCT VFR BLD AUTO: 48.1 % (ref 42–52)
HDLC SERPL-MCNC: 40 MG/DL
HGB BLD-MCNC: 16.1 G/DL (ref 14–18)
IMM GRANULOCYTES # BLD AUTO: 0.06 K/UL (ref 0–0.11)
IMM GRANULOCYTES NFR BLD AUTO: 0.6 % (ref 0–0.9)
LDLC SERPL CALC-MCNC: 114 MG/DL
LYMPHOCYTES # BLD AUTO: 1.41 K/UL (ref 1–4.8)
LYMPHOCYTES NFR BLD: 14.4 % (ref 22–41)
MCH RBC QN AUTO: 30.8 PG (ref 27–33)
MCHC RBC AUTO-ENTMCNC: 33.5 G/DL (ref 33.7–35.3)
MCV RBC AUTO: 92 FL (ref 81.4–97.8)
MONOCYTES # BLD AUTO: 0.76 K/UL (ref 0–0.85)
MONOCYTES NFR BLD AUTO: 7.8 % (ref 0–13.4)
NEUTROPHILS # BLD AUTO: 7.35 K/UL (ref 1.82–7.42)
NEUTROPHILS NFR BLD: 75 % (ref 44–72)
NRBC # BLD AUTO: 0 K/UL
NRBC BLD-RTO: 0 /100 WBC
PLATELET # BLD AUTO: 219 K/UL (ref 164–446)
PMV BLD AUTO: 10.2 FL (ref 9–12.9)
POTASSIUM SERPL-SCNC: 4.6 MMOL/L (ref 3.6–5.5)
PROT SERPL-MCNC: 6.7 G/DL (ref 6–8.2)
RBC # BLD AUTO: 5.23 M/UL (ref 4.7–6.1)
SODIUM SERPL-SCNC: 137 MMOL/L (ref 135–145)
TRIGL SERPL-MCNC: 312 MG/DL (ref 0–149)
TSH SERPL DL<=0.005 MIU/L-ACNC: 2.59 UIU/ML (ref 0.38–5.33)
WBC # BLD AUTO: 9.8 K/UL (ref 4.8–10.8)

## 2020-10-06 PROCEDURE — 84270 ASSAY OF SEX HORMONE GLOBUL: CPT

## 2020-10-06 PROCEDURE — 80053 COMPREHEN METABOLIC PANEL: CPT

## 2020-10-06 PROCEDURE — 82306 VITAMIN D 25 HYDROXY: CPT

## 2020-10-06 PROCEDURE — 84402 ASSAY OF FREE TESTOSTERONE: CPT

## 2020-10-06 PROCEDURE — 36415 COLL VENOUS BLD VENIPUNCTURE: CPT

## 2020-10-06 PROCEDURE — 84403 ASSAY OF TOTAL TESTOSTERONE: CPT

## 2020-10-06 PROCEDURE — 85025 COMPLETE CBC W/AUTO DIFF WBC: CPT

## 2020-10-06 PROCEDURE — 80061 LIPID PANEL: CPT

## 2020-10-06 PROCEDURE — 84443 ASSAY THYROID STIM HORMONE: CPT

## 2020-10-09 LAB
SHBG SERPL-SCNC: 39 NMOL/L (ref 11–80)
TESTOST FREE MFR SERPL: 1.8 % (ref 1.6–2.9)
TESTOST FREE SERPL-MCNC: 113 PG/ML (ref 47–244)
TESTOST SERPL-MCNC: 634 NG/DL (ref 300–720)

## 2020-10-12 ENCOUNTER — OFFICE VISIT (OUTPATIENT)
Dept: MEDICAL GROUP | Facility: PHYSICIAN GROUP | Age: 70
End: 2020-10-12
Payer: MEDICARE

## 2020-10-12 VITALS
DIASTOLIC BLOOD PRESSURE: 72 MMHG | HEIGHT: 63 IN | BODY MASS INDEX: 29.48 KG/M2 | OXYGEN SATURATION: 96 % | SYSTOLIC BLOOD PRESSURE: 130 MMHG | WEIGHT: 166.4 LBS | TEMPERATURE: 97.4 F | HEART RATE: 80 BPM | RESPIRATION RATE: 14 BRPM

## 2020-10-12 DIAGNOSIS — Z79.899 HIGH RISK MEDICATION USE: ICD-10-CM

## 2020-10-12 DIAGNOSIS — F17.200 SMOKER: ICD-10-CM

## 2020-10-12 DIAGNOSIS — Z23 NEED FOR VACCINATION: ICD-10-CM

## 2020-10-12 DIAGNOSIS — R73.03 PREDIABETES: ICD-10-CM

## 2020-10-12 DIAGNOSIS — L82.1 SEBORRHEIC KERATOSES: ICD-10-CM

## 2020-10-12 DIAGNOSIS — E78.49 OTHER HYPERLIPIDEMIA: ICD-10-CM

## 2020-10-12 PROCEDURE — 99214 OFFICE O/P EST MOD 30 MIN: CPT | Mod: 25 | Performed by: PHYSICIAN ASSISTANT

## 2020-10-12 PROCEDURE — 17110 DESTRUCTION B9 LES UP TO 14: CPT | Performed by: PHYSICIAN ASSISTANT

## 2020-10-12 PROCEDURE — 90662 IIV NO PRSV INCREASED AG IM: CPT | Performed by: PHYSICIAN ASSISTANT

## 2020-10-12 PROCEDURE — 99406 BEHAV CHNG SMOKING 3-10 MIN: CPT | Mod: 25 | Performed by: PHYSICIAN ASSISTANT

## 2020-10-12 PROCEDURE — G0008 ADMIN INFLUENZA VIRUS VAC: HCPCS | Performed by: PHYSICIAN ASSISTANT

## 2020-10-12 RX ORDER — SIMVASTATIN 40 MG
TABLET ORAL
Qty: 135 TAB | Refills: 1 | Status: SHIPPED | OUTPATIENT
Start: 2020-10-12 | End: 2021-01-15

## 2020-10-12 ASSESSMENT — FIBROSIS 4 INDEX: FIB4 SCORE: 1.27

## 2020-10-12 NOTE — ASSESSMENT & PLAN NOTE
He's motivated to quit. Did quit for 4 days recently, but restarted. Discussed quitting options in detail. He's quit in past for 10 years. Took it up again about 5-6 years ago. No chronic cough or SOB.

## 2020-10-12 NOTE — PROGRESS NOTES
CC:   Chief Complaint   Patient presents with   • Lab Results   • Hyperlipidemia          HISTORY OF PRESENT ILLNESS: Patient is a 70 y.o. male established patient who presents today to review labs.       Hyperlipidemia  This is a chronic condition.   Latest Labs:   Lab Results   Component Value Date/Time    CHOLSTRLTOT 216 (H) 10/06/2020 09:17 AM     (H) 10/06/2020 09:17 AM    HDL 40 10/06/2020 09:17 AM    TRIGLYCERIDE 312 (H) 10/06/2020 09:17 AM      Medications: simvastatin 40 mg   Medication side effects: none  Diet/Exercise: diet- needs improvement- ice-cream nightly. He is willing to work on this.     Risk calculator: The 10-year ASCVD risk score (Matthew DC Jr., et al., 2013) is: 25.4%       Smoker  He's motivated to quit. Did quit for 4 days recently, but restarted. Discussed quitting options in detail. He's quit in past for 10 years. Took it up again about 5-6 years ago. No chronic cough or SOB.     Prediabetes  Lab Results   Component Value Date/Time    SODIUM 137 10/06/2020 09:17 AM    POTASSIUM 4.6 10/06/2020 09:17 AM    CHLORIDE 102 10/06/2020 09:17 AM    CO2 23 10/06/2020 09:17 AM    ANION 12.0 10/06/2020 09:17 AM    GLUCOSE 103 (H) 10/06/2020 09:17 AM    BUN 18 10/06/2020 09:17 AM    CREATININE 0.99 10/06/2020 09:17 AM    CALCIUM 9.2 10/06/2020 09:17 AM    ASTSGOT 21 10/06/2020 09:17 AM    ALTSGPT 28 10/06/2020 09:17 AM    TBILIRUBIN 0.4 10/06/2020 09:17 AM    ALBUMIN 4.2 10/06/2020 09:17 AM    TOTPROTEIN 6.7 10/06/2020 09:17 AM    GLOBULIN 2.5 10/06/2020 09:17 AM    AGRATIO 1.7 10/06/2020 09:17 AM   ]  No FH DM, discussed diet in detail today- he could cut back on tortillas and ice cream.     Seborrheic keratoses  Skin lesion over right chest smaller than previous visit.  Applied cryotherapy at her last visit.  Patient tolerated well had no complications with cryotherapy.  Would like it frozen again today.  Still itches.      Patient Active Problem List    Diagnosis Date Noted   • Prediabetes  "10/12/2020   • Vitamin D deficiency 09/10/2020   • Seborrheic keratoses 09/10/2020   • Fatigue 09/10/2020   • Vertigo 2019   • Smoker 2019   • Elevated glucose 2016   • Hyperlipidemia 2016   • Major depressive disorder 2016   • Erectile dysfunction 2016   • Prostate cancer (HCC) 2015      Allergies:Patient has no known allergies.    Current Outpatient Medications   Medication Sig Dispense Refill   • simvastatin (ZOCOR) 40 MG Tab Take 1 1/2 tabs each evening. 135 Tab 1   • sertraline (ZOLOFT) 50 MG Tab Take 1 Tab by mouth every day. 90 Tab 1   • Psyllium (METAMUCIL FIBER PO) Take  by mouth.     • Cholecalciferol (VITAMIN D) 2000 UNIT TABS Take 2,000 Units by mouth every evening.     • therapeutic multivitamin-minerals (THERAGRAN-M) TABS Take 1 Tab by mouth every day.       No current facility-administered medications for this visit.        Social History     Tobacco Use   • Smoking status: Current Every Day Smoker     Years: 40.00     Types: Cigarettes     Last attempt to quit: 2014     Years since quittin.9   • Smokeless tobacco: Never Used   • Tobacco comment: a pack a week    Substance Use Topics   • Alcohol use: Yes     Alcohol/week: 0.6 oz     Types: 1 Cans of beer per week   • Drug use: No     Social History     Social History Narrative   • Not on file       Family History   Problem Relation Age of Onset   • Stroke Mother    • Heart Disease Father        Review of Systems:   Constitutional: No Fevers, Chills  Eyes: No vision changes  ENT: No hearing changes  Resp: No Shortness of breath  CV: No Chest pain  GI: No Nausea/Vomiting  MSK: No weakness  Skin: No rashes  Neuro: No Headaches  Psych: No Suicidal ideations    All remaining systems reviewed and found to be negative, except as stated above.    Exam:    /72   Pulse 80   Temp 36.3 °C (97.4 °F) (Temporal)   Resp 14   Ht 1.6 m (5' 3\")   Wt 75.5 kg (166 lb 6.4 oz)   SpO2 96%  Body mass index is " 29.48 kg/m².    General:  Well nourished, well developed male in NAD  HENT: Atraumatic, normocephalic  EYES: Extraocular movements intact  NECK: Supple, FROM  CHEST: No deformities, Equal chest expansion  RESP: Unlabored, no stridor or audible wheeze  HEART: Regular Rate and rhythm.   ABD: Soft, Nontender, Non-Distended  Extremities: No Clubbing, Cyanosis, or Edema  Skin: Warm/dry, without rashes, approximately 5 mm raised verrucous surface lesion over the right anterior chest.  Neuro: A/O x 4, due to COVID-19- did not have patient remove face mask to test cranial nerves.  Motor/sensory grossly intact  Psych: Normal behavior, normal affect      Lab review:  Labs are reviewed and discussed with a patient  Lab Results   Component Value Date/Time    CHOLSTRLTOT 216 (H) 10/06/2020 09:17 AM     (H) 10/06/2020 09:17 AM    HDL 40 10/06/2020 09:17 AM    TRIGLYCERIDE 312 (H) 10/06/2020 09:17 AM       Lab Results   Component Value Date/Time    SODIUM 137 10/06/2020 09:17 AM    POTASSIUM 4.6 10/06/2020 09:17 AM    CHLORIDE 102 10/06/2020 09:17 AM    CO2 23 10/06/2020 09:17 AM    GLUCOSE 103 (H) 10/06/2020 09:17 AM    BUN 18 10/06/2020 09:17 AM    CREATININE 0.99 10/06/2020 09:17 AM    BUNCREATRAT 18 10/27/2017 07:42 AM     Lab Results   Component Value Date/Time    ALKPHOSPHAT 78 10/06/2020 09:17 AM    ASTSGOT 21 10/06/2020 09:17 AM    ALTSGPT 28 10/06/2020 09:17 AM    TBILIRUBIN 0.4 10/06/2020 09:17 AM      No results found for: HBA1C  Lab Results   Component Value Date/Time    TSH 2.650 02/19/2018 04:13 PM     No results found for: FREET4    Lab Results   Component Value Date/Time    WBC 9.8 10/06/2020 09:17 AM    RBC 5.23 10/06/2020 09:17 AM    HEMOGLOBIN 16.1 10/06/2020 09:17 AM    HEMATOCRIT 48.1 10/06/2020 09:17 AM    MCV 92.0 10/06/2020 09:17 AM    MCH 30.8 10/06/2020 09:17 AM    MCHC 33.5 (L) 10/06/2020 09:17 AM    MPV 10.2 10/06/2020 09:17 AM    NEUTSPOLYS 75.00 (H) 10/06/2020 09:17 AM    LYMPHOCYTES 14.40 (L)  10/06/2020 09:17 AM    MONOCYTES 7.80 10/06/2020 09:17 AM    EOSINOPHILS 1.90 10/06/2020 09:17 AM    BASOPHILS 0.30 10/06/2020 09:17 AM        Procedure note: Areas described above on skin were treated with liquid nitrogen. Canister was held in upright position with cryo tip nozzle approximately 1 cm away from the lesion before the trigger was pressed to activate flow of Liquid Nitrogen. Liquid nitrogen was applied for 10-12 seconds to the skin lesion and the expected blistering or scabbing reaction explained. Patient reminded not pick at the area and to expect hypopigmented scars from the procedure. Return if lesion fails to fully resolve.         Assessment/Plan:  1. Need for vaccination  - Influenza Vaccine, High Dose (65+ Only)    2. Other hyperlipidemia  - Lipid Profile; Future  Chronic condition, LDL and triglycerides are not at target.  There is lots of room for improvement with lifestyle modifications we discussed in detail today reducing carbs and sugar.  Patient states he needs to reduce his ice cream intake, we are also going to increase his simvastatin to 1-1/2 tabs a day for a total of 60 mg daily.  If cholesterol does not improve in the next 3 months consider changing to atorvastatin or Crestor.  3. Smoker  Patient is motivated to quit today, spent 5 minutes of the visit today discussing treatment options as far as smoking cessation.  Chantix is too expensive, discussed Wellbutrin, but I be hesitant on this because he is already on an alternative SSRI.  He did quit cold turkey for a few days here recently.  Discussed over-the-counter regimens as well including but not limited nicotine patches, lozenges and gum.  Also discussed vaping bands, though I am not in huge favor of this.  He thinks he is going to try cold turkey again or to quit on his own and then may proceed to over-the-counter regimens.  Encouraged him to keep me posted, or rediscuss at any time if needed.  4. Prediabetes  - HEMOGLOBIN A1C;  Future  Your labs show that your sugar is elevated. I advise reduced sugar/carbohydrate/alcohol, eat more vegetables and lean meats such as fish/chicken/turkey. I also recommend 30 minutes of cardiovascular exercise most days of the week.    5. High risk medication use  - Lipid Profile; Future  - HEPATIC FUNCTION PANEL; Future  - HEMOGLOBIN A1C; Future    6. Seborrheic keratoses  Applied cryotherapy again today, patient tolerated well.  Discussed wound care.  Return for repeat cryo if needed.  Other orders  - simvastatin (ZOCOR) 40 MG Tab; Take 1 1/2 tabs each evening.  Dispense: 135 Tab; Refill: 1       Follow-up: Return in about 3 months (around 1/12/2021) for Follow up on labs.    Please note that this dictation was created using voice recognition software. I have made every reasonable attempt to correct obvious errors, but I expect that there are errors of grammar and possibly content that I did not discover before finalizing the note.

## 2020-10-12 NOTE — ASSESSMENT & PLAN NOTE
Skin lesion over right chest smaller than previous visit.  Applied cryotherapy at her last visit.  Patient tolerated well had no complications with cryotherapy.  Would like it frozen again today.  Still itches.

## 2020-10-12 NOTE — ASSESSMENT & PLAN NOTE
Lab Results   Component Value Date/Time    SODIUM 137 10/06/2020 09:17 AM    POTASSIUM 4.6 10/06/2020 09:17 AM    CHLORIDE 102 10/06/2020 09:17 AM    CO2 23 10/06/2020 09:17 AM    ANION 12.0 10/06/2020 09:17 AM    GLUCOSE 103 (H) 10/06/2020 09:17 AM    BUN 18 10/06/2020 09:17 AM    CREATININE 0.99 10/06/2020 09:17 AM    CALCIUM 9.2 10/06/2020 09:17 AM    ASTSGOT 21 10/06/2020 09:17 AM    ALTSGPT 28 10/06/2020 09:17 AM    TBILIRUBIN 0.4 10/06/2020 09:17 AM    ALBUMIN 4.2 10/06/2020 09:17 AM    TOTPROTEIN 6.7 10/06/2020 09:17 AM    GLOBULIN 2.5 10/06/2020 09:17 AM    AGRATIO 1.7 10/06/2020 09:17 AM   ]  No FH DM, discussed diet in detail today- he could cut back on tortillas and ice cream.

## 2020-10-12 NOTE — ASSESSMENT & PLAN NOTE
This is a chronic condition.   Latest Labs:   Lab Results   Component Value Date/Time    CHOLSTRLTOT 216 (H) 10/06/2020 09:17 AM     (H) 10/06/2020 09:17 AM    HDL 40 10/06/2020 09:17 AM    TRIGLYCERIDE 312 (H) 10/06/2020 09:17 AM      Medications: simvastatin 40 mg   Medication side effects: none  Diet/Exercise: diet- needs improvement- ice-cream nightly. He is willing to work on this.     Risk calculator: The 10-year ASCVD risk score (Mattheweze CANO Jr., et al., 2013) is: 25.4%

## 2020-10-28 DIAGNOSIS — F32.1 MODERATE SINGLE CURRENT EPISODE OF MAJOR DEPRESSIVE DISORDER (HCC): ICD-10-CM

## 2020-10-29 NOTE — TELEPHONE ENCOUNTER
Last seen by PCP 10/12/2020. Depression discussed last September.Will send 6 month(s) to the pharmacy.

## 2021-01-11 ENCOUNTER — HOSPITAL ENCOUNTER (OUTPATIENT)
Dept: LAB | Facility: MEDICAL CENTER | Age: 71
End: 2021-01-11
Attending: PHYSICIAN ASSISTANT
Payer: MEDICARE

## 2021-01-11 DIAGNOSIS — R73.03 PREDIABETES: ICD-10-CM

## 2021-01-11 DIAGNOSIS — E78.49 OTHER HYPERLIPIDEMIA: ICD-10-CM

## 2021-01-11 DIAGNOSIS — Z79.899 HIGH RISK MEDICATION USE: ICD-10-CM

## 2021-01-11 LAB
ALBUMIN SERPL BCP-MCNC: 4.2 G/DL (ref 3.2–4.9)
ALP SERPL-CCNC: 71 U/L (ref 30–99)
ALT SERPL-CCNC: 33 U/L (ref 2–50)
AST SERPL-CCNC: 22 U/L (ref 12–45)
BILIRUB CONJ SERPL-MCNC: <0.2 MG/DL (ref 0.1–0.5)
BILIRUB INDIRECT SERPL-MCNC: NORMAL MG/DL (ref 0–1)
BILIRUB SERPL-MCNC: 0.3 MG/DL (ref 0.1–1.5)
CHOLEST SERPL-MCNC: 202 MG/DL (ref 100–199)
EST. AVERAGE GLUCOSE BLD GHB EST-MCNC: 120 MG/DL
FASTING STATUS PATIENT QL REPORTED: NORMAL
HBA1C MFR BLD: 5.8 % (ref 0–5.6)
HDLC SERPL-MCNC: 33 MG/DL
LDLC SERPL CALC-MCNC: 128 MG/DL
PROT SERPL-MCNC: 6.7 G/DL (ref 6–8.2)
TRIGL SERPL-MCNC: 206 MG/DL (ref 0–149)

## 2021-01-11 PROCEDURE — 36415 COLL VENOUS BLD VENIPUNCTURE: CPT | Mod: GA

## 2021-01-11 PROCEDURE — 83036 HEMOGLOBIN GLYCOSYLATED A1C: CPT | Mod: GA

## 2021-01-11 PROCEDURE — 80076 HEPATIC FUNCTION PANEL: CPT

## 2021-01-11 PROCEDURE — 80061 LIPID PANEL: CPT

## 2021-01-12 ENCOUNTER — TELEPHONE (OUTPATIENT)
Dept: MEDICAL GROUP | Facility: PHYSICIAN GROUP | Age: 71
End: 2021-01-12

## 2021-01-12 NOTE — TELEPHONE ENCOUNTER
Pt's insurnance has has a limit on simvastatin of 30 tabs per 30 days.  Then will not pay for 40 mg 1.5 tabs daily.  Need to ask pt if he is willing to up to 80 mg qd.      Left msg for pt to call back

## 2021-01-14 NOTE — ASSESSMENT & PLAN NOTE
This is a chronic condition.   Latest Labs:   Lab Results   Component Value Date/Time    CHOLSTRLTOT 202 (H) 01/11/2021 08:03 AM     (H) 01/11/2021 08:03 AM    HDL 33 (A) 01/11/2021 08:03 AM    TRIGLYCERIDE 206 (H) 01/11/2021 08:03 AM      Medications: At her last visit we discussed going up on his simvastatin to 60 mg, and we also discussed lifestyle modifications.  Medication side effects: none, tolerating 60 mg without issue.   Diet/Exercise: no significant, but holidays.    Family History of high cholesterol or heart disease? None. Unsure father's history.   Risk calculator: The 10-year ASCVD risk score (Olatoneze CANO Jr., et al., 2013) is: 26.6%

## 2021-01-14 NOTE — ASSESSMENT & PLAN NOTE
Acquired hemoglobin A1c because previous fasting blood sugar was at 103.  Hemoglobin A1c on labs from January 11, 2021 elevated at 5.8.

## 2021-01-15 ENCOUNTER — OFFICE VISIT (OUTPATIENT)
Dept: MEDICAL GROUP | Facility: PHYSICIAN GROUP | Age: 71
End: 2021-01-15
Payer: MEDICARE

## 2021-01-15 VITALS
HEART RATE: 70 BPM | TEMPERATURE: 97.6 F | OXYGEN SATURATION: 94 % | BODY MASS INDEX: 28.7 KG/M2 | DIASTOLIC BLOOD PRESSURE: 66 MMHG | WEIGHT: 162 LBS | RESPIRATION RATE: 12 BRPM | HEIGHT: 63 IN | SYSTOLIC BLOOD PRESSURE: 116 MMHG

## 2021-01-15 DIAGNOSIS — R73.03 PREDIABETES: ICD-10-CM

## 2021-01-15 DIAGNOSIS — Z23 NEED FOR VACCINATION: ICD-10-CM

## 2021-01-15 DIAGNOSIS — F32.0 CURRENT MILD EPISODE OF MAJOR DEPRESSIVE DISORDER, UNSPECIFIED WHETHER RECURRENT (HCC): ICD-10-CM

## 2021-01-15 DIAGNOSIS — E78.49 OTHER HYPERLIPIDEMIA: ICD-10-CM

## 2021-01-15 DIAGNOSIS — F17.200 SMOKER: ICD-10-CM

## 2021-01-15 DIAGNOSIS — C61 PROSTATE CANCER (HCC): ICD-10-CM

## 2021-01-15 DIAGNOSIS — Z12.12 SCREENING FOR COLORECTAL CANCER: ICD-10-CM

## 2021-01-15 DIAGNOSIS — Z79.899 HIGH RISK MEDICATION USE: ICD-10-CM

## 2021-01-15 DIAGNOSIS — Z12.11 SCREENING FOR COLORECTAL CANCER: ICD-10-CM

## 2021-01-15 PROCEDURE — 99214 OFFICE O/P EST MOD 30 MIN: CPT | Performed by: PHYSICIAN ASSISTANT

## 2021-01-15 RX ORDER — SIMVASTATIN 80 MG
80 TABLET ORAL DAILY
Qty: 90 TAB | Refills: 3 | Status: SHIPPED | OUTPATIENT
Start: 2021-01-15 | End: 2021-04-22

## 2021-01-15 ASSESSMENT — FIBROSIS 4 INDEX: FIB4 SCORE: 1.22

## 2021-01-15 NOTE — ASSESSMENT & PLAN NOTE
Followed by Urology- Dr. Brooke. Prostate removed 01/2025. Just had a check up and everything is stable.

## 2021-01-15 NOTE — ASSESSMENT & PLAN NOTE
Re-discussed again today. He's cut way back. Smoking 2 cigarettes per day. Hopeful by next visit to be smoke free.

## 2021-01-15 NOTE — PROGRESS NOTES
CC:   Chief Complaint   Patient presents with   • Lab Results   • Hyperlipidemia          HISTORY OF PRESENT ILLNESS: Patient is a 70 y.o. male established patient who presents today to follow up on the following conditions:        Health Maintenance: Completed  Colonoscopy- most likely due- will refer back to [facility].   Declines shingrix - insurance won't cover- expensive.   Hep C - he unsure if had in past. He's low risk, no tattoos, no blood transfusion, no IV drug use.     Hyperlipidemia  This is a chronic condition.   Latest Labs:   Lab Results   Component Value Date/Time    CHOLSTRLTOT 202 (H) 01/11/2021 08:03 AM     (H) 01/11/2021 08:03 AM    HDL 33 (A) 01/11/2021 08:03 AM    TRIGLYCERIDE 206 (H) 01/11/2021 08:03 AM      Medications: At her last visit we discussed going up on his simvastatin to 60 mg, and we also discussed lifestyle modifications.  Medication side effects: none, tolerating 60 mg without issue.   Diet/Exercise: no significant, but holidays.    Family History of high cholesterol or heart disease? None. Unsure father's history.   Risk calculator: The 10-year ASCVD risk score ([place] RACHAEL Jr., et al., 2013) is: 26.6%       Prediabetes  Acquired hemoglobin A1c because previous fasting blood sugar was at 103.  Hemoglobin A1c on labs from January 11, 2021 elevated at 5.8.    Prostate cancer (HCC)  Followed by Urology- Dr. Brooke. Prostate removed 01/2025. Just had a check up and everything is stable.     Smoker  Re-discussed again today. He's cut way back. Smoking 2 cigarettes per day. Hopeful by next visit to be smoke free.     Major depressive disorder  Stable with sertraline 50 mg. No SI/HI.       Patient Active Problem List    Diagnosis Date Noted   • Prediabetes 10/12/2020   • Vitamin D deficiency 09/10/2020   • Seborrheic keratoses 09/10/2020   • Fatigue 09/10/2020   • Vertigo 08/19/2019   • Smoker 08/19/2019   • Elevated glucose 07/28/2016   • Hyperlipidemia 06/07/2016   • Major depressive  "disorder 2016   • Erectile dysfunction 2016   • Prostate cancer (HCC) 2015      Allergies:Patient has no known allergies.    Current Outpatient Medications   Medication Sig Dispense Refill   • simvastatin (ZOCOR) 80 MG tablet Take 1 Tab by mouth every day. 90 Tab 3   • sertraline (ZOLOFT) 50 MG Tab Take 1 Tab by mouth every day. 90 Tab 2   • Psyllium (METAMUCIL FIBER PO) Take  by mouth.     • Cholecalciferol (VITAMIN D) 2000 UNIT TABS Take 2,000 Units by mouth every evening.     • therapeutic multivitamin-minerals (THERAGRAN-M) TABS Take 1 Tab by mouth every day.       No current facility-administered medications for this visit.        Social History     Tobacco Use   • Smoking status: Current Every Day Smoker     Years: 40.00     Types: Cigarettes     Last attempt to quit: 2014     Years since quittin.2   • Smokeless tobacco: Never Used   • Tobacco comment: a pack a week    Substance Use Topics   • Alcohol use: Yes     Alcohol/week: 0.6 oz     Types: 1 Cans of beer per week   • Drug use: No     Social History     Social History Narrative   • Not on file       Family History   Problem Relation Age of Onset   • Stroke Mother    • Heart Disease Father        Review of Systems:    Constitutional: No Fevers, Chills  Eyes: No vision changes  ENT: No hearing changes  Resp: No Shortness of breath  CV: No Chest pain  GI: No Nausea/Vomiting  MSK: No weakness  Skin: No rashes  Neuro: No Headaches  Psych: No Suicidal ideations    All remaining systems reviewed and found to be negative, except as stated above.    Exam:    /66   Pulse 70   Temp 36.4 °C (97.6 °F) (Temporal)   Resp 12   Ht 1.6 m (5' 3\")   Wt 73.5 kg (162 lb)   SpO2 94%  Body mass index is 28.7 kg/m².    General:  Well nourished, well developed male in NAD  HENT: Atraumatic, normocephalic  EYES: Extraocular movements intact  NECK: Supple, FROM  CHEST: No deformities, Equal chest expansion  RESP: Unlabored, no stridor or " audible wheeze  HEART: Regular Rate and rhythm.   Extremities: No Clubbing, Cyanosis, or Edema  Skin: Warm/dry, without rashes  Neuro: A/O x 4, due to COVID-19- did not have patient remove face mask to test cranial nerves.  Motor/sensory grossly intact  Psych: Normal behavior, normal affect      Lab review:  Labs are reviewed and discussed with a patient  Lab Results   Component Value Date/Time    CHOLSTRLTOT 202 (H) 01/11/2021 08:03 AM     (H) 01/11/2021 08:03 AM    HDL 33 (A) 01/11/2021 08:03 AM    TRIGLYCERIDE 206 (H) 01/11/2021 08:03 AM       Lab Results   Component Value Date/Time    SODIUM 137 10/06/2020 09:17 AM    POTASSIUM 4.6 10/06/2020 09:17 AM    CHLORIDE 102 10/06/2020 09:17 AM    CO2 23 10/06/2020 09:17 AM    GLUCOSE 103 (H) 10/06/2020 09:17 AM    BUN 18 10/06/2020 09:17 AM    CREATININE 0.99 10/06/2020 09:17 AM    BUNCREATRAT 18 10/27/2017 07:42 AM     Lab Results   Component Value Date/Time    ALKPHOSPHAT 71 01/11/2021 08:03 AM    ASTSGOT 22 01/11/2021 08:03 AM    ALTSGPT 33 01/11/2021 08:03 AM    TBILIRUBIN 0.3 01/11/2021 08:03 AM      Lab Results   Component Value Date/Time    HBA1C 5.8 (H) 01/11/2021 08:03 AM     Lab Results   Component Value Date/Time    TSH 2.650 02/19/2018 04:13 PM     No results found for: FREET4    Lab Results   Component Value Date/Time    WBC 9.8 10/06/2020 09:17 AM    RBC 5.23 10/06/2020 09:17 AM    HEMOGLOBIN 16.1 10/06/2020 09:17 AM    HEMATOCRIT 48.1 10/06/2020 09:17 AM    MCV 92.0 10/06/2020 09:17 AM    MCH 30.8 10/06/2020 09:17 AM    MCHC 33.5 (L) 10/06/2020 09:17 AM    MPV 10.2 10/06/2020 09:17 AM    NEUTSPOLYS 75.00 (H) 10/06/2020 09:17 AM    LYMPHOCYTES 14.40 (L) 10/06/2020 09:17 AM    MONOCYTES 7.80 10/06/2020 09:17 AM    EOSINOPHILS 1.90 10/06/2020 09:17 AM    BASOPHILS 0.30 10/06/2020 09:17 AM          Assessment/Plan:  1. Other hyperlipidemia  - Lipid Profile; Future  Chronic condition, lipids not at goal, tolerated 60 mg simvastatin without issue.   Going to increase this to 80 mg.  Repeat labs in 90 days.  2. Prediabetes  - HEMOGLOBIN A1C; Future  Hemoglobin A1c at 5.8.  Holidays may have affected this.  Going to repeat 90 days.  Recommend lifestyle modifications reducing carbs and sugar, increase exercise.      3. Prostate cancer (HCC)  Chronic condition followed by Dr. Brooke.  Status post prostatectomy 2015, in remission since.    4. Smoker  Patient is doing awesome job on cutting back on this.  Smoking 2 cigarettes a day, he is hopeful that he will be smoke-free by her next visit.    5. Screening for colorectal cancer  - REFERRAL TO GI FOR COLONOSCOPY  Per computer last colonoscopy was 2000, he thinks he had one about 10 years ago that was normal as well.  This would put him due at about 2020.  Will send referral for updated colonoscopy.  [facility].    6. High risk medication use  - HEPATIC FUNCTION PANEL; Future    7. Current mild episode of major depressive disorder, unspecified whether recurrent (HCC)  Chronic condition.  Stable with sertraline 50 mg daily.  Other orders  - simvastatin (ZOCOR) 80 MG tablet; Take 1 Tab by mouth every day.  Dispense: 90 Tab; Refill: 3       Follow-up: Return in about 3 months (around 4/15/2021) for Follow up on labs.    Please note that this dictation was created using voice recognition software. I have made every reasonable attempt to correct obvious errors, but I expect that there are errors of grammar and possibly content that I did not discover before finalizing the note.

## 2021-02-16 ENCOUNTER — APPOINTMENT (OUTPATIENT)
Dept: FAMILY PLANNING/WOMEN'S HEALTH CLINIC | Facility: IMMUNIZATION CENTER | Age: 71
End: 2021-02-16
Attending: INTERNAL MEDICINE
Payer: MEDICARE

## 2021-02-16 ENCOUNTER — IMMUNIZATION (OUTPATIENT)
Dept: FAMILY PLANNING/WOMEN'S HEALTH CLINIC | Facility: IMMUNIZATION CENTER | Age: 71
End: 2021-02-16
Payer: MEDICARE

## 2021-02-16 DIAGNOSIS — Z23 NEED FOR VACCINATION: ICD-10-CM

## 2021-02-16 DIAGNOSIS — Z23 ENCOUNTER FOR VACCINATION: Primary | ICD-10-CM

## 2021-02-16 PROCEDURE — 0001A PFIZER SARS-COV-2 VACCINE: CPT

## 2021-02-16 PROCEDURE — 91300 PFIZER SARS-COV-2 VACCINE: CPT

## 2021-03-06 ENCOUNTER — IMMUNIZATION (OUTPATIENT)
Dept: FAMILY PLANNING/WOMEN'S HEALTH CLINIC | Facility: IMMUNIZATION CENTER | Age: 71
End: 2021-03-06
Attending: INTERNAL MEDICINE
Payer: MEDICARE

## 2021-03-06 DIAGNOSIS — Z23 ENCOUNTER FOR VACCINATION: Primary | ICD-10-CM

## 2021-03-06 PROCEDURE — 91300 PFIZER SARS-COV-2 VACCINE: CPT | Performed by: NURSE PRACTITIONER

## 2021-03-06 PROCEDURE — 0002A PFIZER SARS-COV-2 VACCINE: CPT | Performed by: NURSE PRACTITIONER

## 2021-04-19 ENCOUNTER — HOSPITAL ENCOUNTER (OUTPATIENT)
Dept: LAB | Facility: MEDICAL CENTER | Age: 71
End: 2021-04-19
Attending: PHYSICIAN ASSISTANT
Payer: MEDICARE

## 2021-04-19 DIAGNOSIS — R73.03 PREDIABETES: ICD-10-CM

## 2021-04-19 DIAGNOSIS — E78.49 OTHER HYPERLIPIDEMIA: ICD-10-CM

## 2021-04-19 DIAGNOSIS — Z79.899 HIGH RISK MEDICATION USE: ICD-10-CM

## 2021-04-19 LAB
ALBUMIN SERPL BCP-MCNC: 4.4 G/DL (ref 3.2–4.9)
ALP SERPL-CCNC: 83 U/L (ref 30–99)
ALT SERPL-CCNC: 29 U/L (ref 2–50)
AST SERPL-CCNC: 22 U/L (ref 12–45)
BILIRUB CONJ SERPL-MCNC: <0.2 MG/DL (ref 0.1–0.5)
BILIRUB INDIRECT SERPL-MCNC: NORMAL MG/DL (ref 0–1)
BILIRUB SERPL-MCNC: 0.3 MG/DL (ref 0.1–1.5)
CHOLEST SERPL-MCNC: 199 MG/DL (ref 100–199)
EST. AVERAGE GLUCOSE BLD GHB EST-MCNC: 120 MG/DL
FASTING STATUS PATIENT QL REPORTED: NORMAL
HBA1C MFR BLD: 5.8 % (ref 4–5.6)
HDLC SERPL-MCNC: 35 MG/DL
LDLC SERPL CALC-MCNC: 124 MG/DL
PROT SERPL-MCNC: 6.9 G/DL (ref 6–8.2)
TRIGL SERPL-MCNC: 199 MG/DL (ref 0–149)

## 2021-04-19 PROCEDURE — 80061 LIPID PANEL: CPT

## 2021-04-19 PROCEDURE — 36415 COLL VENOUS BLD VENIPUNCTURE: CPT

## 2021-04-19 PROCEDURE — 83036 HEMOGLOBIN GLYCOSYLATED A1C: CPT | Mod: GA

## 2021-04-19 PROCEDURE — 80076 HEPATIC FUNCTION PANEL: CPT

## 2021-04-20 NOTE — ASSESSMENT & PLAN NOTE
Slight improvement but not at goal, patient is on simvastatin 80 mg would recommend trial of an alternative statin at this time possibly atorvastatin or Crestor.

## 2021-04-22 ENCOUNTER — OFFICE VISIT (OUTPATIENT)
Dept: MEDICAL GROUP | Facility: PHYSICIAN GROUP | Age: 71
End: 2021-04-22
Payer: MEDICARE

## 2021-04-22 ENCOUNTER — HOSPITAL ENCOUNTER (OUTPATIENT)
Dept: RADIOLOGY | Facility: MEDICAL CENTER | Age: 71
End: 2021-04-22
Attending: PHYSICIAN ASSISTANT
Payer: MEDICARE

## 2021-04-22 VITALS
WEIGHT: 160 LBS | DIASTOLIC BLOOD PRESSURE: 62 MMHG | BODY MASS INDEX: 28.35 KG/M2 | HEART RATE: 63 BPM | OXYGEN SATURATION: 97 % | HEIGHT: 63 IN | RESPIRATION RATE: 12 BRPM | SYSTOLIC BLOOD PRESSURE: 110 MMHG | TEMPERATURE: 97.7 F

## 2021-04-22 DIAGNOSIS — N50.811 PAIN IN RIGHT TESTICLE: ICD-10-CM

## 2021-04-22 DIAGNOSIS — F32.0 CURRENT MILD EPISODE OF MAJOR DEPRESSIVE DISORDER, UNSPECIFIED WHETHER RECURRENT (HCC): ICD-10-CM

## 2021-04-22 DIAGNOSIS — E78.49 OTHER HYPERLIPIDEMIA: ICD-10-CM

## 2021-04-22 DIAGNOSIS — R73.03 PREDIABETES: ICD-10-CM

## 2021-04-22 PROCEDURE — 99214 OFFICE O/P EST MOD 30 MIN: CPT | Performed by: PHYSICIAN ASSISTANT

## 2021-04-22 PROCEDURE — 76870 US EXAM SCROTUM: CPT

## 2021-04-22 RX ORDER — ATORVASTATIN CALCIUM 40 MG/1
40 TABLET, FILM COATED ORAL DAILY
Qty: 30 TABLET | Refills: 2 | Status: SHIPPED | OUTPATIENT
Start: 2021-04-22 | End: 2021-05-18

## 2021-04-22 ASSESSMENT — FIBROSIS 4 INDEX: FIB4 SCORE: 1.32

## 2021-04-22 NOTE — ASSESSMENT & PLAN NOTE
Right testicular pain-started about 1 1/2 weeks ago. Some pain going up into groin region, bilaterally. Urinating normal- no pain with urination, but a little bit increased frequency.

## 2021-04-22 NOTE — PROGRESS NOTES
CC:   Chief Complaint   Patient presents with   • Lab Results   • Hyperlipidemia   • Testicle Swelling     Right x 1.5 weeks          HISTORY OF PRESENT ILLNESS: Patient is a 71 y.o. male established patient who presents today to follow up on the following conditions:       Health Maintenance: Completed  Due AWV    Hyperlipidemia      Slight improvement but not at goal, patient is on simvastatin 80 mg would recommend trial of an alternative statin at this time possibly atorvastatin or Crestor.    Prediabetes  Hemoglobin A1c is unchanged at 5.8 would recommend continued lifestyle modifications.    Pain in right testicle  Right testicular pain-started about 1 1/2 weeks ago. Some pain going up into groin region, bilaterally. Urinating normal- no pain with urination, but a little bit increased frequency.     Major depressive disorder  Still doing well on sertraline 50 mg. Very stable.       Patient Active Problem List    Diagnosis Date Noted   • Pain in right testicle 04/22/2021   • Prediabetes 10/12/2020   • Vitamin D deficiency 09/10/2020   • Seborrheic keratoses 09/10/2020   • Fatigue 09/10/2020   • Vertigo 08/19/2019   • Smoker 08/19/2019   • Elevated glucose 07/28/2016   • Hyperlipidemia 06/07/2016   • Major depressive disorder 06/07/2016   • Erectile dysfunction 06/07/2016   • Prostate cancer (HCC) 01/20/2015      Allergies:Patient has no known allergies.    Current Outpatient Medications   Medication Sig Dispense Refill   • atorvastatin (LIPITOR) 40 MG Tab Take 1 tablet by mouth every day. 30 tablet 2   • sertraline (ZOLOFT) 50 MG Tab Take 1 Tab by mouth every day. 90 Tab 2   • Psyllium (METAMUCIL FIBER PO) Take  by mouth.     • Cholecalciferol (VITAMIN D) 2000 UNIT TABS Take 2,000 Units by mouth every evening.     • therapeutic multivitamin-minerals (THERAGRAN-M) TABS Take 1 Tab by mouth every day.       No current facility-administered medications for this visit.       Social History     Tobacco Use   • Smoking  "status: Current Every Day Smoker     Years: 40.00     Types: Cigarettes     Last attempt to quit: 2014     Years since quittin.4   • Smokeless tobacco: Never Used   • Tobacco comment: a pack a week    Substance Use Topics   • Alcohol use: Yes     Alcohol/week: 0.6 oz     Types: 1 Cans of beer per week   • Drug use: No     Social History     Social History Narrative   • Not on file       Family History   Problem Relation Age of Onset   • Stroke Mother    • Heart Disease Father        Review of Systems:    Constitutional: No Fevers, Chills  Eyes: No vision changes  ENT: No hearing changes  Resp: No Shortness of breath  CV: No Chest pain  GI: No Nausea/Vomiting  MSK: No weakness  Skin: No rashes  Neuro: No Headaches  Psych: No Suicidal ideations    All remaining systems reviewed and found to be negative, except as stated above.    Exam:    /62   Pulse 63   Temp 36.5 °C (97.7 °F) (Temporal)   Resp 12   Ht 1.6 m (5' 3\")   Wt 72.6 kg (160 lb)   SpO2 97%  Body mass index is 28.34 kg/m².    General:  Well nourished, well developed male in NAD  HENT: Atraumatic, normocephalic  EYES: Extraocular movements intact  NECK: Supple, FROM  CHEST: No deformities, Equal chest expansion  RESP: Unlabored, no stridor or audible wheeze  HEART: Regular Rate and rhythm.   : After receiving consent from patient performed  exam: Testicles moderately swollen bilaterally right greater than left.  Hard nodular mass palpated in the right testicle.  Extremities: No Clubbing, Cyanosis, or Edema  Skin: Warm/dry, without rashes  Neuro: A/O x 4, due to COVID-19- did not have patient remove face mask to test cranial nerves.  Motor/sensory grossly intact  Psych: Normal behavior, normal affect      Lab review:  Labs are reviewed and discussed with a patient  Lab Results   Component Value Date/Time    CHOLSTRLTOT 199 2021 10:35 AM     (H) 2021 10:35 AM    HDL 35 (A) 2021 10:35 AM    TRIGLYCERIDE 199 (H) " 04/19/2021 10:35 AM       Lab Results   Component Value Date/Time    SODIUM 137 10/06/2020 09:17 AM    POTASSIUM 4.6 10/06/2020 09:17 AM    CHLORIDE 102 10/06/2020 09:17 AM    CO2 23 10/06/2020 09:17 AM    GLUCOSE 103 (H) 10/06/2020 09:17 AM    BUN 18 10/06/2020 09:17 AM    CREATININE 0.99 10/06/2020 09:17 AM    BUNCREATRAT 18 10/27/2017 07:42 AM     Lab Results   Component Value Date/Time    ALKPHOSPHAT 83 04/19/2021 10:35 AM    ASTSGOT 22 04/19/2021 10:35 AM    ALTSGPT 29 04/19/2021 10:35 AM    TBILIRUBIN 0.3 04/19/2021 10:35 AM      Lab Results   Component Value Date/Time    HBA1C 5.8 (H) 04/19/2021 10:35 AM    HBA1C 5.8 (H) 01/11/2021 08:03 AM     Lab Results   Component Value Date/Time    TSH 2.650 02/19/2018 04:13 PM     No results found for: FREET4    Lab Results   Component Value Date/Time    WBC 9.8 10/06/2020 09:17 AM    RBC 5.23 10/06/2020 09:17 AM    HEMOGLOBIN 16.1 10/06/2020 09:17 AM    HEMATOCRIT 48.1 10/06/2020 09:17 AM    MCV 92.0 10/06/2020 09:17 AM    MCH 30.8 10/06/2020 09:17 AM    MCHC 33.5 (L) 10/06/2020 09:17 AM    MPV 10.2 10/06/2020 09:17 AM    NEUTSPOLYS 75.00 (H) 10/06/2020 09:17 AM    LYMPHOCYTES 14.40 (L) 10/06/2020 09:17 AM    MONOCYTES 7.80 10/06/2020 09:17 AM    EOSINOPHILS 1.90 10/06/2020 09:17 AM    BASOPHILS 0.30 10/06/2020 09:17 AM          Assessment/Plan:  1. Other hyperlipidemia  - Lipid Profile; Future  - HEPATIC FUNCTION PANEL; Future  Chronic condition, would like to switch his simvastatin to atorvastatin 40 mg.  We will start out at a half tab for a week make sure he tolerates the medication then bump it up to a full tab.  Recheck labs in 3 months.  Patient admits he needs to improve his diet as well, he will try to reduce his ice cream consumption and things such as red meats, purcell, sausage, dairy.  2. Prediabetes  Chronic condition.  Stable.  No changes in A1c.  Repeat A1c in office in 3 months.  3. Pain in right testicle  - URINALYSIS,CULTURE IF INDICATED; Future  -  [id number]; Future  Bilateral testicular swelling with hard nodular mass palpated in the right, recommend stat scrotal ultrasound for further assessment, will also order urinalysis.  Patient is not sexually active, since his prostatectomy.  No risk STI.  Scheduled patient for ultrasound at 4:00 today.  Will call patient with results and proceed with plan depending on results of ultrasound and urinalysis.  4. Current mild episode of major depressive disorder, unspecified whether recurrent (HCC)  Chronic condition.  Stable on sertraline 50 mg daily.  Other orders  - atorvastatin (LIPITOR) 40 MG Tab; Take 1 tablet by mouth every day.  Dispense: 30 tablet; Refill: 2       Follow-up: Return for Please schedule AWV seperately. .    Please note that this dictation was created using voice recognition software. I have made every reasonable attempt to correct obvious errors, but I expect that there are errors of grammar and possibly content that I did not discover before finalizing the note.

## 2021-04-23 ENCOUNTER — HOSPITAL ENCOUNTER (OUTPATIENT)
Dept: LAB | Facility: MEDICAL CENTER | Age: 71
End: 2021-04-23
Attending: PHYSICIAN ASSISTANT
Payer: MEDICARE

## 2021-04-23 DIAGNOSIS — N50.811 PAIN IN RIGHT TESTICLE: ICD-10-CM

## 2021-04-23 LAB
AMORPH CRY #/AREA URNS HPF: PRESENT /HPF
APPEARANCE UR: ABNORMAL
BACTERIA #/AREA URNS HPF: ABNORMAL /HPF
BILIRUB UR QL STRIP.AUTO: NEGATIVE
CAOX CRY #/AREA URNS HPF: ABNORMAL /HPF
COLOR UR: YELLOW
EPI CELLS #/AREA URNS HPF: ABNORMAL /HPF
GLUCOSE UR STRIP.AUTO-MCNC: NEGATIVE MG/DL
KETONES UR STRIP.AUTO-MCNC: NEGATIVE MG/DL
LEUKOCYTE ESTERASE UR QL STRIP.AUTO: NEGATIVE
MICRO URNS: ABNORMAL
MUCOUS THREADS #/AREA URNS HPF: ABNORMAL /HPF
NITRITE UR QL STRIP.AUTO: NEGATIVE
PH UR STRIP.AUTO: 6 [PH] (ref 5–8)
PROT UR QL STRIP: NEGATIVE MG/DL
RBC # URNS HPF: ABNORMAL /HPF
RBC UR QL AUTO: NEGATIVE
SP GR UR STRIP.AUTO: >=1.03
UROBILINOGEN UR STRIP.AUTO-MCNC: 0.2 MG/DL
WBC #/AREA URNS HPF: ABNORMAL /HPF

## 2021-04-23 PROCEDURE — 81001 URINALYSIS AUTO W/SCOPE: CPT

## 2021-04-26 ENCOUNTER — TELEPHONE (OUTPATIENT)
Dept: MEDICAL GROUP | Facility: PHYSICIAN GROUP | Age: 71
End: 2021-04-26

## 2021-04-26 RX ORDER — SULFAMETHOXAZOLE AND TRIMETHOPRIM 800; 160 MG/1; MG/1
1 TABLET ORAL 2 TIMES DAILY
Qty: 20 TABLET | Refills: 0 | Status: SHIPPED | OUTPATIENT
Start: 2021-04-26 | End: 2021-05-06

## 2021-04-26 NOTE — PROGRESS NOTES
Treating patient for possible epididymitis with Bactrim DS twice a day for 10 days.  Urinalysis showed blood in the urine but otherwise relatively unremarkable for acute infection.

## 2021-05-07 ENCOUNTER — HOSPITAL ENCOUNTER (OUTPATIENT)
Dept: LAB | Facility: MEDICAL CENTER | Age: 71
End: 2021-05-07
Attending: PHYSICIAN ASSISTANT
Payer: MEDICARE

## 2021-05-07 ENCOUNTER — OFFICE VISIT (OUTPATIENT)
Dept: MEDICAL GROUP | Facility: PHYSICIAN GROUP | Age: 71
End: 2021-05-07
Payer: MEDICARE

## 2021-05-07 VITALS
BODY MASS INDEX: 27.93 KG/M2 | HEIGHT: 63 IN | SYSTOLIC BLOOD PRESSURE: 110 MMHG | DIASTOLIC BLOOD PRESSURE: 70 MMHG | WEIGHT: 157.6 LBS | OXYGEN SATURATION: 97 % | RESPIRATION RATE: 12 BRPM | TEMPERATURE: 97.6 F | HEART RATE: 81 BPM

## 2021-05-07 DIAGNOSIS — R50.9 FEVER, UNSPECIFIED FEVER CAUSE: ICD-10-CM

## 2021-05-07 DIAGNOSIS — R21 RASH: ICD-10-CM

## 2021-05-07 DIAGNOSIS — N50.811 PAIN IN RIGHT TESTICLE: ICD-10-CM

## 2021-05-07 LAB
ALBUMIN SERPL BCP-MCNC: 4.2 G/DL (ref 3.2–4.9)
ALBUMIN/GLOB SERPL: 1.8 G/DL
ALP SERPL-CCNC: 86 U/L (ref 30–99)
ALT SERPL-CCNC: 38 U/L (ref 2–50)
ANION GAP SERPL CALC-SCNC: 9 MMOL/L (ref 7–16)
AST SERPL-CCNC: 24 U/L (ref 12–45)
BASOPHILS # BLD AUTO: 0.2 % (ref 0–1.8)
BASOPHILS # BLD: 0.01 K/UL (ref 0–0.12)
BILIRUB SERPL-MCNC: 0.3 MG/DL (ref 0.1–1.5)
BUN SERPL-MCNC: 24 MG/DL (ref 8–22)
CALCIUM SERPL-MCNC: 8.8 MG/DL (ref 8.5–10.5)
CHLORIDE SERPL-SCNC: 104 MMOL/L (ref 96–112)
CO2 SERPL-SCNC: 22 MMOL/L (ref 20–33)
CREAT SERPL-MCNC: 1.67 MG/DL (ref 0.5–1.4)
EOSINOPHIL # BLD AUTO: 0.27 K/UL (ref 0–0.51)
EOSINOPHIL NFR BLD: 4.4 % (ref 0–6.9)
ERYTHROCYTE [DISTWIDTH] IN BLOOD BY AUTOMATED COUNT: 44.1 FL (ref 35.9–50)
FASTING STATUS PATIENT QL REPORTED: NORMAL
GLOBULIN SER CALC-MCNC: 2.4 G/DL (ref 1.9–3.5)
GLUCOSE SERPL-MCNC: 141 MG/DL (ref 65–99)
HCT VFR BLD AUTO: 44 % (ref 42–52)
HGB BLD-MCNC: 14.7 G/DL (ref 14–18)
IMM GRANULOCYTES # BLD AUTO: 0.02 K/UL (ref 0–0.11)
IMM GRANULOCYTES NFR BLD AUTO: 0.3 % (ref 0–0.9)
LYMPHOCYTES # BLD AUTO: 0.66 K/UL (ref 1–4.8)
LYMPHOCYTES NFR BLD: 10.8 % (ref 22–41)
MCH RBC QN AUTO: 30.5 PG (ref 27–33)
MCHC RBC AUTO-ENTMCNC: 33.4 G/DL (ref 33.7–35.3)
MCV RBC AUTO: 91.3 FL (ref 81.4–97.8)
MONOCYTES # BLD AUTO: 0.67 K/UL (ref 0–0.85)
MONOCYTES NFR BLD AUTO: 10.9 % (ref 0–13.4)
NEUTROPHILS # BLD AUTO: 4.49 K/UL (ref 1.82–7.42)
NEUTROPHILS NFR BLD: 73.4 % (ref 44–72)
NRBC # BLD AUTO: 0 K/UL
NRBC BLD-RTO: 0 /100 WBC
PLATELET # BLD AUTO: 142 K/UL (ref 164–446)
PMV BLD AUTO: 10.8 FL (ref 9–12.9)
POTASSIUM SERPL-SCNC: 4.6 MMOL/L (ref 3.6–5.5)
PROT SERPL-MCNC: 6.6 G/DL (ref 6–8.2)
RBC # BLD AUTO: 4.82 M/UL (ref 4.7–6.1)
SODIUM SERPL-SCNC: 135 MMOL/L (ref 135–145)
WBC # BLD AUTO: 6.1 K/UL (ref 4.8–10.8)

## 2021-05-07 PROCEDURE — 99214 OFFICE O/P EST MOD 30 MIN: CPT | Performed by: PHYSICIAN ASSISTANT

## 2021-05-07 PROCEDURE — 85025 COMPLETE CBC W/AUTO DIFF WBC: CPT

## 2021-05-07 PROCEDURE — 36415 COLL VENOUS BLD VENIPUNCTURE: CPT

## 2021-05-07 PROCEDURE — 80053 COMPREHEN METABOLIC PANEL: CPT

## 2021-05-07 RX ORDER — VALACYCLOVIR HYDROCHLORIDE 1 G/1
1000 TABLET, FILM COATED ORAL 3 TIMES DAILY
Qty: 21 TABLET | Refills: 0 | Status: SHIPPED | OUTPATIENT
Start: 2021-05-07 | End: 2021-05-14

## 2021-05-07 ASSESSMENT — FIBROSIS 4 INDEX: FIB4 SCORE: 1.32

## 2021-05-07 NOTE — ASSESSMENT & PLAN NOTE
Rash started a couple of days ago, he stopped the antibiotic yesterday because of the rash.  Does not itch.  Covers his arms, chest, abdomen and back.  Patient has not started his new cholesterol medication yet because of the rash.

## 2021-05-07 NOTE — ASSESSMENT & PLAN NOTE
Last visit treated patient with bactrim for possible epididymitis. Patient has an appointment with Dr. Brooke next month. Stopped antibiotic yesterday because of rash. Was 1 day shy of finishing regimen. Tuesday night had fever and chills, come and goes. Wednesday and Thursday had fever and chills as well. None today. Some diarrhea 2 days ago, now resolved. No blood in the stool. No cough, sore-throat, ear pain, congestion. Slight headache. Some muscle aches in his arms. This has been going on for a while- months. Some fatigue as well.

## 2021-05-10 ENCOUNTER — PATIENT MESSAGE (OUTPATIENT)
Dept: MEDICAL GROUP | Facility: PHYSICIAN GROUP | Age: 71
End: 2021-05-10

## 2021-05-10 ENCOUNTER — TELEPHONE (OUTPATIENT)
Dept: MEDICAL GROUP | Facility: PHYSICIAN GROUP | Age: 71
End: 2021-05-10

## 2021-05-10 DIAGNOSIS — N28.9 RENAL INSUFFICIENCY: ICD-10-CM

## 2021-05-10 DIAGNOSIS — R79.89 ABNORMAL CBC: ICD-10-CM

## 2021-05-10 NOTE — TELEPHONE ENCOUNTER
----- Message from Felicia Mabry P.A.-C. sent at 5/10/2021 12:46 PM PDT -----  Please call patient about their results.     Results showed: Labs indicate slightly low lymphocytes and elevated neutrophils.  This could be caused from an infection.  His white blood cell count is normal though.  Platelets were slightly low would recommend repeating this with routine labs to make sure it normalizes.  In addition the kidney function was a little bit low, this could be dehydration before the labs.    Would like verification on how the patient is doing since he stopped the antibiotics.  Symptoms resolved?    If you have any questions or concerns, do not hesitate to contact me or my Medical Assistant. Thank you for your time today.     Respectfully,     Felicia Mabry PA-C

## 2021-05-18 RX ORDER — ATORVASTATIN CALCIUM 40 MG/1
40 TABLET, FILM COATED ORAL DAILY
Qty: 90 TABLET | Refills: 1 | Status: SHIPPED | OUTPATIENT
Start: 2021-05-18 | End: 2021-11-10

## 2021-05-18 NOTE — TELEPHONE ENCOUNTER
Pt has had OV within the 12 month protocol and lipid panel is current. 6 month supply sent to pharmacy. Labs are betetr.  Lab Results   Component Value Date/Time    CHOLSTRLTOT 199 04/19/2021 10:35 AM     (H) 04/19/2021 10:35 AM    HDL 35 (A) 04/19/2021 10:35 AM    TRIGLYCERIDE 199 (H) 04/19/2021 10:35 AM       Lab Results   Component Value Date/Time    SODIUM 135 05/07/2021 11:24 AM    POTASSIUM 4.6 05/07/2021 11:24 AM    CHLORIDE 104 05/07/2021 11:24 AM    CO2 22 05/07/2021 11:24 AM    GLUCOSE 141 (H) 05/07/2021 11:24 AM    BUN 24 (H) 05/07/2021 11:24 AM    CREATININE 1.67 (H) 05/07/2021 11:24 AM    BUNCREATRAT 18 10/27/2017 07:42 AM     Lab Results   Component Value Date/Time    ALKPHOSPHAT 86 05/07/2021 11:24 AM    ASTSGOT 24 05/07/2021 11:24 AM    ALTSGPT 38 05/07/2021 11:24 AM    TBILIRUBIN 0.3 05/07/2021 11:24 AM

## 2021-12-06 RX ORDER — ATORVASTATIN CALCIUM 40 MG/1
40 TABLET, FILM COATED ORAL DAILY
Qty: 15 TABLET | Refills: 0 | Status: SHIPPED | OUTPATIENT
Start: 2021-12-06 | End: 2022-01-24

## 2021-12-28 DIAGNOSIS — F32.1 MODERATE SINGLE CURRENT EPISODE OF MAJOR DEPRESSIVE DISORDER (HCC): ICD-10-CM

## 2022-01-18 ENCOUNTER — OFFICE VISIT (OUTPATIENT)
Dept: MEDICAL GROUP | Facility: PHYSICIAN GROUP | Age: 72
End: 2022-01-18
Payer: MEDICARE

## 2022-01-18 VITALS
DIASTOLIC BLOOD PRESSURE: 74 MMHG | SYSTOLIC BLOOD PRESSURE: 138 MMHG | OXYGEN SATURATION: 97 % | HEART RATE: 66 BPM | BODY MASS INDEX: 28.88 KG/M2 | HEIGHT: 63 IN | WEIGHT: 163 LBS | RESPIRATION RATE: 18 BRPM | TEMPERATURE: 98.6 F

## 2022-01-18 DIAGNOSIS — R31.0 GROSS HEMATURIA: ICD-10-CM

## 2022-01-18 DIAGNOSIS — Z12.11 SCREENING FOR COLORECTAL CANCER: ICD-10-CM

## 2022-01-18 DIAGNOSIS — R73.03 PREDIABETES: ICD-10-CM

## 2022-01-18 DIAGNOSIS — Z12.12 SCREENING FOR COLORECTAL CANCER: ICD-10-CM

## 2022-01-18 DIAGNOSIS — E78.49 OTHER HYPERLIPIDEMIA: ICD-10-CM

## 2022-01-18 DIAGNOSIS — R79.89 ABNORMAL CBC: ICD-10-CM

## 2022-01-18 DIAGNOSIS — F32.0 CURRENT MILD EPISODE OF MAJOR DEPRESSIVE DISORDER, UNSPECIFIED WHETHER RECURRENT (HCC): ICD-10-CM

## 2022-01-18 DIAGNOSIS — Z11.59 NEED FOR HEPATITIS C SCREENING TEST: ICD-10-CM

## 2022-01-18 DIAGNOSIS — N28.9 RENAL INSUFFICIENCY: ICD-10-CM

## 2022-01-18 DIAGNOSIS — N50.811 PAIN IN RIGHT TESTICLE: ICD-10-CM

## 2022-01-18 DIAGNOSIS — C61 PROSTATE CANCER (HCC): ICD-10-CM

## 2022-01-18 PROCEDURE — 99214 OFFICE O/P EST MOD 30 MIN: CPT

## 2022-01-18 ASSESSMENT — PATIENT HEALTH QUESTIONNAIRE - PHQ9
5. POOR APPETITE OR OVEREATING: NOT AT ALL
2. FEELING DOWN, DEPRESSED, IRRITABLE, OR HOPELESS: NOT AT ALL
SUM OF ALL RESPONSES TO PHQ9 QUESTIONS 1 AND 2: 0
6. FEELING BAD ABOUT YOURSELF - OR THAT YOU ARE A FAILURE OR HAVE LET YOURSELF OR YOUR FAMILY DOWN: NOT AL ALL
7. TROUBLE CONCENTRATING ON THINGS, SUCH AS READING THE NEWSPAPER OR WATCHING TELEVISION: NOT AT ALL
3. TROUBLE FALLING OR STAYING ASLEEP OR SLEEPING TOO MUCH: NOT AT ALL
SUM OF ALL RESPONSES TO PHQ QUESTIONS 1-9: 0
9. THOUGHTS THAT YOU WOULD BE BETTER OFF DEAD, OR OF HURTING YOURSELF: NOT AT ALL
8. MOVING OR SPEAKING SO SLOWLY THAT OTHER PEOPLE COULD HAVE NOTICED. OR THE OPPOSITE, BEING SO FIGETY OR RESTLESS THAT YOU HAVE BEEN MOVING AROUND A LOT MORE THAN USUAL: NOT AT ALL
1. LITTLE INTEREST OR PLEASURE IN DOING THINGS: NOT AT ALL
4. FEELING TIRED OR HAVING LITTLE ENERGY: NOT AT ALL

## 2022-01-18 ASSESSMENT — FIBROSIS 4 INDEX: FIB4 SCORE: 1.95

## 2022-01-18 NOTE — ASSESSMENT & PLAN NOTE
Patient reports that the pain in his right testicle from his previous visit with Felicia in May 2021 has resolved.  He did see urology [place], who reportedly told him that they were not concerned as long as he was not symptomatic.  He reports that the swelling is significantly improved.

## 2022-01-18 NOTE — ASSESSMENT & PLAN NOTE
Results for NIMA FREDERICK (MRN [id number]) as of 1/18/2022 13:46   Ref. Range 5/7/2021 11:24   Bun Latest Ref Range: 8 - 22 mg/dL 24 (H)   Creatinine Latest Ref Range: 0.50 - 1.40 mg/dL 1.67 (H)   GFR If  Latest Ref Range: >60 mL/min/1.73 m 2 49 (A)   GFR If Non  Latest Ref Range: >60 mL/min/1.73 m 2 41 (A)   Patient was noted to have a GFR 41, elevated creatinine and BUN.  Recheck CMP.  Patient reports he has never been told he has kidney disease

## 2022-01-18 NOTE — ASSESSMENT & PLAN NOTE
Patient reports that he takes sertraline 50 mg daily.  He is stable on this medication, no refill needed today

## 2022-01-18 NOTE — PROGRESS NOTES
CC:   Chief Complaint   Patient presents with   • Establish Care        HISTORY OF PRESENT ILLNESS: Patient is a 71 y.o. male established patient who presents today to discuss the following problems below:     Pain in right testicle  Patient reports that the pain in his right testicle from his previous visit with Felicia in May 2021 has resolved.  He did see u[facility] [place], who reportedly told him that they were not concerned as long as he was not symptomatic.  He reports that the swelling is significantly improved.     Prostate cancer (HCC)  History of prostate cancer with resection.  He reports that he is now able to see Dr. Brooke with [facility] [place] annually for surveillance.    Hyperlipidemia  Patient reports that he is taking atorvastatin 40 mg nightly.  He is due for an updated lipid panel.  He does need a refill of his atorvastatin    Major depressive disorder  Patient reports that he takes sertraline 50 mg daily.  He is stable on this medication, no refill needed today    Gross hematuria  Patient reports that a few months ago he had an episode of gross hematuria in which she saw blood clots in the toilet after voiding.  He reports that there is no pain.  It happened 1 time, and he had no further episodes.    Renal insufficiency  Results for NIMA FREDERICK (MRN [id number]) as of 1/18/2022 13:46   Ref. Range 5/7/2021 11:24   Bun Latest Ref Range: 8 - 22 mg/dL 24 (H)   Creatinine Latest Ref Range: 0.50 - 1.40 mg/dL 1.67 (H)   GFR If  Latest Ref Range: >60 mL/min/1.73 m 2 49 (A)   GFR If Non  Latest Ref Range: >60 mL/min/1.73 m 2 41 (A)   Patient was noted to have a GFR 41, elevated creatinine and BUN.  Recheck CMP.  Patient reports he has never been told he has kidney disease      Past Medical History:   Diagnosis Date   • Acute pain of left knee 11/1/2016   • Cancer (HCC) 01/15    Prostate   • Cold 01/04/15    chest congestion, afebrile,sore throat   • Dental disorder      "Partial upper   • High cholesterol    • Other specified disorder of intestines     Diarrhea   • Snoring     No sleep study       Allergies:Bactrim ds    Review of Systems: Otherwise negative except for as stated above.      Exam: /74 (BP Location: Left arm, Patient Position: Sitting, BP Cuff Size: Adult)   Pulse 66   Temp 37 °C (98.6 °F) (Temporal)   Resp 18   Ht 1.6 m (5' 3\")   Wt 73.9 kg (163 lb)   SpO2 97%  Body mass index is 28.87 kg/m².    Gen: Alert and oriented x4. Well developed, well-nourished male in no apparent distress.  Skin: Warm, dry, good turgor, no rashes in visible areas or lacerations appreciated.   Eye: EOM intact, pupils equal, round and reactive, conjunctiva clear, lids normal.  Neck: Trachea midline, no masses, no thyromegaly  Lungs: Normal effort, CTA bilaterally, no wheezes, rhonchi, or rales. No stridor or audible wheezing. Equal chest expansion.   CV: Regular rate and rhythm. No murmurs, rubs, or gallops.  GI:  Soft, non-tender abdomen with no distention.   MSK: Normal gait, moves all extremities.  Neuro: Alert and oriented x 4, non-focal exam with motor and sensory grossly intact.  Ext: No clubbing, cyanosis, edema.  Psych: Normal behavior, affect and mood.      Assessment/Plan:  71 y.o. male with the following -    1. Abnormal CBC  Low platelets of 142 noted in May 2021.  Recheck CBC for resolution versus persistence.  Further work-up if indicated  - CBC WITHOUT DIFFERENTIAL; Future    2. Gross hematuria  Discussed with patient that even though his gross hematuria has resolved, I would like to obtain a UA to rule out microscopic hematuria.  - URINALYSIS; Future    3. Prediabetes  Chronic condition, stable.  Last hemoglobin A1c was done in May 2021, stable at 5.8%.  Due for repeat  - HEMOGLOBIN A1C; Future    4. Renal insufficiency  Renal insufficiency noted from labs in May 2021.  Not previously noted on labs from 2019 or 2020.   Results for NIMA FREDERICK (MRN " [id number]) as of 2022 13:46   Ref. Range 2021 11:24   Bun Latest Ref Range: 8 - 22 mg/dL 24 (H)   Creatinine Latest Ref Range: 0.50 - 1.40 mg/dL 1.67 (H)   GFR If  Latest Ref Range: >60 mL/min/1.73 m 2 49 (A)   GFR If Non  Latest Ref Range: >60 mL/min/1.73 m 2 41 (A)       Recheck CMP  - Comp Metabolic Panel; Future    5. Other hyperlipidemia  Chronic condition, stable.  Patient takes atorvastatin 40 mg nightly.  Last lipid panel was done in 2021.   Results for NIMA FREDERCIK (MRN [id number]) as of 2022 13:46   Ref. Range 2021 10:35   Cholesterol,Tot Latest Ref Range: 100 - 199 mg/dL 199   Triglycerides Latest Ref Range: 0 - 149 mg/dL 199 (H)   HDL Latest Ref Range: >=40 mg/dL 35 (A)   LDL Latest Ref Range: <100 mg/dL 124 (H)     - Lipid Profile; Future    6. Prostate cancer (HCC)  Chronic condition, stable.  Follows with u[facility] [place] annually for surveillance and monitoring    7. Pain in right testicle  Resolved.  No further episodes of right testicular pain or swelling.    8. Screening for colorectal cancer  Patient notes it has been 10 years since his last colonoscopy, he is due for repeat  - Referral to GI for Colonoscopy    9. Current mild episode of major depressive disorder, unspecified whether recurrent (HCC)  Chronic condition, stable.  Continue sertraline 50 mg daily.  Patient aware that he may contact me for new or worsened depressive symptoms for reevaluation of medication dose    10. Need for hepatitis C screening test  - HCV Scrn ( [id number] 1xLife); Future    Follow-up: Return in about 4 weeks (around 2/15/2022) for labs .    Health Maintenance: Completed      Please note that this dictation was created using voice recognition software. I have made every reasonable attempt to correct obvious errors, but I expect that there are errors of grammar and possibly content that I did not discover before finalizing the note.    Electronically  signed by LUZ Law-C on January 18, 2022

## 2022-01-18 NOTE — ASSESSMENT & PLAN NOTE
Patient reports that he is taking atorvastatin 40 mg nightly.  He is due for an updated lipid panel.  He does need a refill of his atorvastatin

## 2022-01-18 NOTE — ASSESSMENT & PLAN NOTE
Patient reports that a few months ago he had an episode of gross hematuria in which she saw blood clots in the toilet after voiding.  He reports that there is no pain.  It happened 1 time, and he had no further episodes.

## 2022-02-03 ENCOUNTER — HOSPITAL ENCOUNTER (OUTPATIENT)
Dept: LAB | Facility: MEDICAL CENTER | Age: 72
End: 2022-02-03
Payer: MEDICARE

## 2022-02-03 DIAGNOSIS — N28.9 RENAL INSUFFICIENCY: ICD-10-CM

## 2022-02-03 DIAGNOSIS — Z11.59 NEED FOR HEPATITIS C SCREENING TEST: ICD-10-CM

## 2022-02-03 DIAGNOSIS — R31.0 GROSS HEMATURIA: ICD-10-CM

## 2022-02-03 DIAGNOSIS — R73.03 PREDIABETES: ICD-10-CM

## 2022-02-03 DIAGNOSIS — R79.89 ABNORMAL CBC: ICD-10-CM

## 2022-02-03 DIAGNOSIS — E78.49 OTHER HYPERLIPIDEMIA: ICD-10-CM

## 2022-02-03 LAB
ALBUMIN SERPL BCP-MCNC: 4.8 G/DL (ref 3.2–4.9)
ALBUMIN/GLOB SERPL: 2.1 G/DL
ALP SERPL-CCNC: 89 U/L (ref 30–99)
ALT SERPL-CCNC: 28 U/L (ref 2–50)
ANION GAP SERPL CALC-SCNC: 11 MMOL/L (ref 7–16)
APPEARANCE UR: CLEAR
AST SERPL-CCNC: 19 U/L (ref 12–45)
BILIRUB SERPL-MCNC: 0.4 MG/DL (ref 0.1–1.5)
BILIRUB UR QL STRIP.AUTO: NEGATIVE
BUN SERPL-MCNC: 17 MG/DL (ref 8–22)
CALCIUM SERPL-MCNC: 9.6 MG/DL (ref 8.5–10.5)
CHLORIDE SERPL-SCNC: 103 MMOL/L (ref 96–112)
CHOLEST SERPL-MCNC: 213 MG/DL (ref 100–199)
CO2 SERPL-SCNC: 24 MMOL/L (ref 20–33)
COLOR UR: YELLOW
CREAT SERPL-MCNC: 1.08 MG/DL (ref 0.5–1.4)
ERYTHROCYTE [DISTWIDTH] IN BLOOD BY AUTOMATED COUNT: 43.7 FL (ref 35.9–50)
EST. AVERAGE GLUCOSE BLD GHB EST-MCNC: 131 MG/DL
FASTING STATUS PATIENT QL REPORTED: NORMAL
GLOBULIN SER CALC-MCNC: 2.3 G/DL (ref 1.9–3.5)
GLUCOSE SERPL-MCNC: 101 MG/DL (ref 65–99)
GLUCOSE UR STRIP.AUTO-MCNC: NEGATIVE MG/DL
HBA1C MFR BLD: 6.2 % (ref 4–5.6)
HCT VFR BLD AUTO: 47 % (ref 42–52)
HDLC SERPL-MCNC: 37 MG/DL
HGB BLD-MCNC: 16 G/DL (ref 14–18)
KETONES UR STRIP.AUTO-MCNC: NEGATIVE MG/DL
LDLC SERPL CALC-MCNC: 138 MG/DL
LEUKOCYTE ESTERASE UR QL STRIP.AUTO: NEGATIVE
MCH RBC QN AUTO: 31.2 PG (ref 27–33)
MCHC RBC AUTO-ENTMCNC: 34 G/DL (ref 33.7–35.3)
MCV RBC AUTO: 91.6 FL (ref 81.4–97.8)
MICRO URNS: NORMAL
NITRITE UR QL STRIP.AUTO: NEGATIVE
PH UR STRIP.AUTO: 5.5 [PH] (ref 5–8)
PLATELET # BLD AUTO: 245 K/UL (ref 164–446)
PMV BLD AUTO: 10.4 FL (ref 9–12.9)
POTASSIUM SERPL-SCNC: 5 MMOL/L (ref 3.6–5.5)
PROT SERPL-MCNC: 7.1 G/DL (ref 6–8.2)
PROT UR QL STRIP: NEGATIVE MG/DL
RBC # BLD AUTO: 5.13 M/UL (ref 4.7–6.1)
RBC UR QL AUTO: NEGATIVE
SODIUM SERPL-SCNC: 138 MMOL/L (ref 135–145)
SP GR UR STRIP.AUTO: 1.01
TRIGL SERPL-MCNC: 188 MG/DL (ref 0–149)
UROBILINOGEN UR STRIP.AUTO-MCNC: 0.2 MG/DL
WBC # BLD AUTO: 8.6 K/UL (ref 4.8–10.8)

## 2022-02-03 PROCEDURE — 83036 HEMOGLOBIN GLYCOSYLATED A1C: CPT | Mod: GA

## 2022-02-03 PROCEDURE — 80061 LIPID PANEL: CPT

## 2022-02-03 PROCEDURE — 81003 URINALYSIS AUTO W/O SCOPE: CPT

## 2022-02-03 PROCEDURE — 36415 COLL VENOUS BLD VENIPUNCTURE: CPT

## 2022-02-03 PROCEDURE — 80053 COMPREHEN METABOLIC PANEL: CPT

## 2022-02-03 PROCEDURE — 85027 COMPLETE CBC AUTOMATED: CPT

## 2022-02-24 ENCOUNTER — TELEPHONE (OUTPATIENT)
Dept: SCHEDULING | Facility: IMAGING CENTER | Age: 72
End: 2022-02-24
Payer: MEDICARE

## 2022-03-17 ENCOUNTER — OFFICE VISIT (OUTPATIENT)
Dept: MEDICAL GROUP | Facility: PHYSICIAN GROUP | Age: 72
End: 2022-03-17
Payer: MEDICARE

## 2022-03-17 VITALS
WEIGHT: 163 LBS | SYSTOLIC BLOOD PRESSURE: 118 MMHG | BODY MASS INDEX: 27.83 KG/M2 | TEMPERATURE: 97.3 F | RESPIRATION RATE: 16 BRPM | HEART RATE: 68 BPM | HEIGHT: 64 IN | OXYGEN SATURATION: 96 % | DIASTOLIC BLOOD PRESSURE: 70 MMHG

## 2022-03-17 DIAGNOSIS — R73.03 PREDIABETES: ICD-10-CM

## 2022-03-17 DIAGNOSIS — Z79.899 HIGH RISK MEDICATION USE: ICD-10-CM

## 2022-03-17 DIAGNOSIS — E78.49 OTHER HYPERLIPIDEMIA: ICD-10-CM

## 2022-03-17 DIAGNOSIS — F17.200 SMOKING: ICD-10-CM

## 2022-03-17 DIAGNOSIS — N28.9 RENAL INSUFFICIENCY: ICD-10-CM

## 2022-03-17 PROCEDURE — 99214 OFFICE O/P EST MOD 30 MIN: CPT | Mod: 25

## 2022-03-17 PROCEDURE — 99406 BEHAV CHNG SMOKING 3-10 MIN: CPT

## 2022-03-17 RX ORDER — ATORVASTATIN CALCIUM 80 MG/1
80 TABLET, FILM COATED ORAL NIGHTLY
Qty: 90 TABLET | Refills: 0 | Status: SHIPPED | OUTPATIENT
Start: 2022-03-17 | End: 2022-06-11

## 2022-03-17 ASSESSMENT — FIBROSIS 4 INDEX: FIB4 SCORE: 1.06

## 2022-03-17 NOTE — PROGRESS NOTES
"CC:   Chief Complaint   Patient presents with   • Follow-Up   • Medication Refill        HISTORY OF PRESENT ILLNESS: Patient is a 72 y.o. male established patient who presents today to discuss the following problems below:     Mixed hyperlipidemia  Patient had recent labs done and presents to review the results.  Listed as below    Results for NIMA FREDERICK (MRN [id number]) as of 3/17/2022 08:16   Ref. Range 2/3/2022 08:15   Cholesterol,Tot Latest Ref Range: 100 - 199 mg/dL 213 (H)   Triglycerides Latest Ref Range: 0 - 149 mg/dL 188 (H)   HDL Latest Ref Range: >=40 mg/dL 37 (A)   LDL Latest Ref Range: <100 mg/dL 138 (H)       Renal insufficiency  Patient previously had an episode of renal insufficiency managed 2021.  He had an updated CMP, results as below.  Results for NIMA FREDERICK (MRN [id number]) as of 3/17/2022 08:16   Ref. Range 2/3/2022 08:15   Bun Latest Ref Range: 8 - 22 mg/dL 17   Creatinine Latest Ref Range: 0.50 - 1.40 mg/dL 1.08   GFR If  Latest Ref Range: >60 mL/min/1.73 m 2 >60   GFR If Non  Latest Ref Range: >60 mL/min/1.73 m 2 >60       Prediabetes  Patient had an increase of his hemoglobin A1c from 5.8% to 6.2%      Past Medical History:   Diagnosis Date   • Acute pain of left knee 11/1/2016   • Cancer (HCC) 01/15    Prostate   • Cold 01/04/15    chest congestion, afebrile,sore throat   • Dental disorder     Partial upper   • High cholesterol    • Other specified disorder of intestines     Diarrhea   • Snoring     No sleep study       Allergies:Bactrim ds    Review of Systems: Otherwise negative except for as stated above.      Exam: /70 (BP Location: Left arm, Patient Position: Sitting, BP Cuff Size: Adult)   Pulse 68   Temp 36.3 °C (97.3 °F) (Temporal)   Resp 16   Ht 1.626 m (5' 4\")   Wt 73.9 kg (163 lb)   SpO2 96%  Body mass index is 27.98 kg/m².    Gen: Alert and oriented x4. Well developed, well-nourished male in no apparent " distress.  Skin: Warm, dry, good turgor, no rashes in visible areas or lacerations appreciated.   Eye: EOM intact, pupils equal, round and reactive, conjunctiva clear, lids normal.  Neck: Trachea midline, no masses, no thyromegaly  GI:  Soft, non-tender abdomen with no distention.   MSK: Normal gait, moves all extremities.  Neuro: Alert and oriented x 4, non-focal exam with motor and sensory grossly intact.  Ext: No clubbing, cyanosis, edema.  Psych: Normal behavior, affect and mood.      Assessment/Plan:  72 y.o. male with the following -    1. Other hyperlipidemia  Chronic condition, worsened.  Patient has limited risk factors in regards to his lipid panel, however he is currently still smoking in his ASCVD risk score remains elevated at 24%.  Recommend increasing atorvastatin to 80 mg.  Patient will return in 3 months for an updated lipid and CMP  - atorvastatin (LIPITOR) 80 MG tablet; Take 1 Tablet by mouth every evening.  Dispense: 90 Tablet; Refill: 0  - Lipid Profile; Future    2. High risk medication use  Recheck CMP in 3 months secondary to increase of atorvastatin  - Comp Metabolic Panel; Future    3. Renal insufficiency  Resolved.    4. Prediabetes  Chronic condition, worsened from previous labs.  Discussed with patient that he is still in the prediabetic range, however his A1c has increased from 5.8% to 6.2%.  I would recommend rechecking this in 6 months.  Educated on dietary changes to improve his A1c    5. Smoking  Chronic condition.  3 minutes spent discussing the risks of smoking in relation to development of cardiovascular disease, specifically the relationship between smoking to HDL and atherosclerosis.  Patient is continuing to work on trying to quit, he is down to a pack a week    Follow-up: Return in about 3 months (around 6/17/2022) for recheck lipids/cmp .    Health Maintenance: Completed patient was given the phone number to GI consultants to schedule his colonoscopy.       Please note that  this dictation was created using voice recognition software. I have made every reasonable attempt to correct obvious errors, but I expect that there are errors of grammar and possibly content that I did not discover before finalizing the note.    Electronically signed by TRICIA Law on March 17, 2022

## 2022-03-17 NOTE — ASSESSMENT & PLAN NOTE
Patient had recent labs done and presents to review the results.  Listed as below    Results for NIMA FREDERICK (MRN [id number]) as of 3/17/2022 08:16   Ref. Range 2/3/2022 08:15   Cholesterol,Tot Latest Ref Range: 100 - 199 mg/dL 213 (H)   Triglycerides Latest Ref Range: 0 - 149 mg/dL 188 (H)   HDL Latest Ref Range: >=40 mg/dL 37 (A)   LDL Latest Ref Range: <100 mg/dL 138 (H)

## 2022-03-17 NOTE — ASSESSMENT & PLAN NOTE
Patient previously had an episode of renal insufficiency managed 2021.  He had an updated CMP, results as below.  Results for NIMA FREDERICK (MRN [id number]) as of 3/17/2022 08:16   Ref. Range 2/3/2022 08:15   Bun Latest Ref Range: 8 - 22 mg/dL 17   Creatinine Latest Ref Range: 0.50 - 1.40 mg/dL 1.08   GFR If  Latest Ref Range: >60 mL/min/1.73 m 2 >60   GFR If Non  Latest Ref Range: >60 mL/min/1.73 m 2 >60

## 2022-06-09 DIAGNOSIS — E78.49 OTHER HYPERLIPIDEMIA: ICD-10-CM

## 2022-06-11 RX ORDER — ATORVASTATIN CALCIUM 80 MG/1
TABLET, FILM COATED ORAL
Qty: 90 TABLET | Refills: 0 | Status: SHIPPED | OUTPATIENT
Start: 2022-06-11 | End: 2022-09-09

## 2022-06-15 ENCOUNTER — HOSPITAL ENCOUNTER (OUTPATIENT)
Dept: LAB | Facility: MEDICAL CENTER | Age: 72
End: 2022-06-15
Payer: MEDICARE

## 2022-06-15 DIAGNOSIS — Z79.899 HIGH RISK MEDICATION USE: ICD-10-CM

## 2022-06-15 DIAGNOSIS — E78.49 OTHER HYPERLIPIDEMIA: ICD-10-CM

## 2022-06-15 LAB
ALBUMIN SERPL BCP-MCNC: 4.4 G/DL (ref 3.2–4.9)
ALBUMIN/GLOB SERPL: 1.6 G/DL
ALP SERPL-CCNC: 113 U/L (ref 30–99)
ALT SERPL-CCNC: 42 U/L (ref 2–50)
ANION GAP SERPL CALC-SCNC: 11 MMOL/L (ref 7–16)
AST SERPL-CCNC: 14 U/L (ref 12–45)
BILIRUB SERPL-MCNC: 0.4 MG/DL (ref 0.1–1.5)
BUN SERPL-MCNC: 14 MG/DL (ref 8–22)
CALCIUM SERPL-MCNC: 9.5 MG/DL (ref 8.5–10.5)
CHLORIDE SERPL-SCNC: 105 MMOL/L (ref 96–112)
CHOLEST SERPL-MCNC: 183 MG/DL (ref 100–199)
CO2 SERPL-SCNC: 23 MMOL/L (ref 20–33)
CREAT SERPL-MCNC: 1 MG/DL (ref 0.5–1.4)
FASTING STATUS PATIENT QL REPORTED: NORMAL
GFR SERPLBLD CREATININE-BSD FMLA CKD-EPI: 80 ML/MIN/1.73 M 2
GLOBULIN SER CALC-MCNC: 2.7 G/DL (ref 1.9–3.5)
GLUCOSE SERPL-MCNC: 108 MG/DL (ref 65–99)
HDLC SERPL-MCNC: 38 MG/DL
LDLC SERPL CALC-MCNC: 118 MG/DL
POTASSIUM SERPL-SCNC: 5.1 MMOL/L (ref 3.6–5.5)
PROT SERPL-MCNC: 7.1 G/DL (ref 6–8.2)
SODIUM SERPL-SCNC: 139 MMOL/L (ref 135–145)
TRIGL SERPL-MCNC: 136 MG/DL (ref 0–149)

## 2022-06-15 PROCEDURE — 80053 COMPREHEN METABOLIC PANEL: CPT

## 2022-06-15 PROCEDURE — 36415 COLL VENOUS BLD VENIPUNCTURE: CPT

## 2022-06-15 PROCEDURE — 80061 LIPID PANEL: CPT

## 2022-06-20 NOTE — PROGRESS NOTES
Subjective:     CC: No chief complaint on file.        HPI:   Miguel presents today to discuss the following issues:      Past Medical History:   Diagnosis Date   • Acute pain of left knee 2016   • Cancer (HCC) 01/15    Prostate   • Cold 01/04/15    chest congestion, afebrile,sore throat   • Dental disorder     Partial upper   • High cholesterol    • Other specified disorder of intestines     Diarrhea   • Snoring     No sleep study       Social History     Tobacco Use   • Smoking status: Current Every Day Smoker     Years: 40.00     Types: Cigarettes     Last attempt to quit: 2014     Years since quittin.6   • Smokeless tobacco: Never Used   • Tobacco comment: a pack a week    Vaping Use   • Vaping Use: Never used   Substance Use Topics   • Alcohol use: Yes     Alcohol/week: 0.6 oz     Types: 1 Cans of beer per week   • Drug use: No       Current Outpatient Medications Ordered in Epic   Medication Sig Dispense Refill   • atorvastatin (LIPITOR) 80 MG tablet TAKE 1 TABLET BY MOUTH EVERY DAY IN THE EVENING 90 Tablet 0   • sertraline (ZOLOFT) 50 MG Tab TAKE 1 TABLET BY MOUTH EVERY DAY 90 Tablet 2   • Papav-Phentolamine-Alprostadil 30-1-0.02 MG/ML Solution Tri-Mix 10/1/30   PGE-1 10 mcg / Phentolamine 1 mg / Papaverine 30 mg per ml   Inject 0.1ml into base of penis as directed 30 minutes prior to intercourse. Can increase dose by 0.05mluntil adequate erection is achieved, max dose of 0.5ml. Must wait 24 hours between doses. (Patient not taking: Reported on 3/17/2022)     • Psyllium (METAMUCIL FIBER PO) Take  by mouth.     • Cholecalciferol (VITAMIN D) 2000 UNIT TABS Take 2,000 Units by mouth every evening.     • therapeutic multivitamin-minerals (THERAGRAN-M) TABS Take 1 Tab by mouth every day.       No current [facility]-ordered facility-administered medications on file.       Allergies:  Bactrim ds    Health Maintenance: Completed    ROS:   Denies any recent fevers or chills. No nausea or vomiting. No chest  pains or shortness of breath.      Objective:       Exam:  There were no vitals taken for this visit. There is no height or weight on file to calculate BMI.    Gen: Alert and oriented, No apparent distress.  Lungs: Normal effort, CTA bilaterally, no wheezes, rhonchi, or rales  CV: Regular rate and rhythm. No murmurs, rubs, or gallops.  Ext: No clubbing, cyanosis, edema.        Assessment & Plan:     72 y.o. male with the following -         I spent a total of *** minutes with record review, exam, communication with the patient, communication with other providers, and documentation of this encounter.      No follow-ups on file.    Please note that this dictation was created using voice recognition software. I have made every reasonable attempt to correct obvious errors, but I expect that there are errors of grammar and possibly content that I did not discover before finalizing the note.

## 2022-06-23 ENCOUNTER — APPOINTMENT (OUTPATIENT)
Dept: MEDICAL GROUP | Facility: PHYSICIAN GROUP | Age: 72
End: 2022-06-23
Payer: MEDICARE

## 2022-06-29 ENCOUNTER — APPOINTMENT (OUTPATIENT)
Dept: MEDICAL GROUP | Facility: PHYSICIAN GROUP | Age: 72
End: 2022-06-29
Payer: MEDICARE

## 2022-06-30 ENCOUNTER — OFFICE VISIT (OUTPATIENT)
Dept: MEDICAL GROUP | Facility: PHYSICIAN GROUP | Age: 72
End: 2022-06-30
Payer: MEDICARE

## 2022-06-30 VITALS
HEART RATE: 62 BPM | WEIGHT: 156.25 LBS | DIASTOLIC BLOOD PRESSURE: 66 MMHG | HEIGHT: 64 IN | SYSTOLIC BLOOD PRESSURE: 110 MMHG | TEMPERATURE: 97.2 F | RESPIRATION RATE: 16 BRPM | BODY MASS INDEX: 26.67 KG/M2 | OXYGEN SATURATION: 94 %

## 2022-06-30 DIAGNOSIS — R73.03 PREDIABETES: ICD-10-CM

## 2022-06-30 DIAGNOSIS — E78.2 MIXED HYPERLIPIDEMIA: ICD-10-CM

## 2022-06-30 PROCEDURE — 99214 OFFICE O/P EST MOD 30 MIN: CPT | Performed by: NURSE PRACTITIONER

## 2022-06-30 RX ORDER — ATORVASTATIN CALCIUM 40 MG/1
TABLET, FILM COATED ORAL
COMMUNITY
End: 2022-06-30

## 2022-06-30 ASSESSMENT — FIBROSIS 4 INDEX: FIB4 SCORE: 0.63

## 2022-06-30 NOTE — ASSESSMENT & PLAN NOTE
"Chronic, improving.  Currently taking Atorvastatin 80 mg as directed.   Denies side effects of the medication--no myalgias or abdominal pain.  10-year cardiac risk ASCVD score: 22.3%  Reports diet is \"okay\".   He is following a low-cholesterol diet.  He is not exercising regularly.  He is not taking ASA daily.  He denies dizziness, claudication, or chest pain.  Due for updated lab work December 2022.     04/19/21 10:35 02/03/22 08:15 06/15/22 09:19   Cholesterol,Tot 199 213 (H) 183   Triglycerides 199 (H) 188 (H) 136   HDL 35 ! 37 ! 38 !    (H) 138 (H) 118 (H)     "

## 2022-06-30 NOTE — PROGRESS NOTES
"Subjective  Chief Complaint  Lab Results    History of Present Illness  Miguel Carranza is a 72 y.o. male. This established patient is here today to go over recent lab results.    His PCP is MAURY Law.    Mixed hyperlipidemia  Chronic, improving.  Currently taking Atorvastatin 80 mg as directed.   Denies side effects of the medication--no myalgias or abdominal pain.  10-year cardiac risk ASCVD score: 22.3%  Reports diet is \"okay\".   He is following a low-cholesterol diet.  He is not exercising regularly.  He is not taking ASA daily.  He denies dizziness, claudication, or chest pain.  Due for updated lab work December 2022.     04/19/21 10:35 02/03/22 08:15 06/15/22 09:19   Cholesterol,Tot 199 213 (H) 183   Triglycerides 199 (H) 188 (H) 136   HDL 35 ! 37 ! 38 !    (H) 138 (H) 118 (H)       Prediabetes  Chronic and ongoing.  Currently working on his diet.     05/07/21 11:24 02/03/22 08:15 06/15/22 09:19   Glucose 141 (H) 101 (H) 108 (H)      01/11/21 08:03 04/19/21 10:35 02/03/22 08:15   Glycohemoglobin 5.8 (H) 5.8 (H) 6.2 (H)     Past Medical History    Allergies: Bactrim ds  Past Medical History:   Diagnosis Date   • Acute pain of left knee 11/1/2016   • Cancer (HCC) 01/15    Prostate   • Cold 01/04/15    chest congestion, afebrile,sore throat   • Dental disorder     Partial upper   • High cholesterol    • Other specified disorder of intestines     Diarrhea   • Snoring     No sleep study     Past Surgical History:   Procedure Laterality Date   • INGUINAL HERNIA REPAIR  07/2018    rt side   • PROSTATECTOMY ROBOTIC XI  1/20/2015    Performed by Brandyn Brooke M.D. at SURGERY [facility]   • VASECTOMY  1990s     Current Outpatient Medications Ordered in Epic   Medication Sig Dispense Refill   • atorvastatin (LIPITOR) 80 MG tablet TAKE 1 TABLET BY MOUTH EVERY DAY IN THE EVENING 90 Tablet 0   • sertraline (ZOLOFT) 50 MG Tab TAKE 1 TABLET BY MOUTH EVERY DAY 90 Tablet 2   • " "Papav-Phentolamine-Alprostadil 30-1-0.02 MG/ML Solution      • Psyllium (METAMUCIL FIBER PO) Take  by mouth.     • Cholecalciferol (VITAMIN D) 2000 UNIT TABS Take 2,000 Units by mouth every evening.     • therapeutic multivitamin-minerals (THERAGRAN-M) TABS Take 1 Tab by mouth every day.       No current [facility]-ordered facility-administered medications on file.     Family History:    Family History   Problem Relation Age of Onset   • Stroke Mother    • Heart Disease Father       Personal/Social History:    Social History     Tobacco Use   • Smoking status: Current Every Day Smoker     Years: 40.00     Types: Cigarettes     Last attempt to quit: 2014     Years since quittin.6   • Smokeless tobacco: Never Used   • Tobacco comment: a pack a week    Vaping Use   • Vaping Use: Never used   Substance Use Topics   • Alcohol use: Not Currently     Alcohol/week: 0.6 oz     Types: 1 Cans of beer per week   • Drug use: No     Social History     Social History Narrative   • Not on file      Review of Systems:   General: Negative for fever/chills and unexpected weight change.   Eyes:  Negative for vision changes, eye pain.  Respiratory:  Negative for cough and dyspnea.    Cardiovascular:  Negative for chest pain and palpitations.  Musculoskeletal:  Negative for myalgias.   Skin:  Negative for rash.   Neurological:  Negative for numbness/tingling and headaches.     Objective  Physical Exam:   /66 (BP Location: Left arm, Patient Position: Sitting, BP Cuff Size: Adult)   Pulse 62   Temp 36.2 °C (97.2 °F) (Temporal)   Resp 16   Ht 1.626 m (5' 4\")   Wt 70.9 kg (156 lb 4 oz)   SpO2 94%  Body mass index is 26.82 kg/m².  General:  Alert and oriented.  Well appearing.  NAD  Neck: Supple without JVD. No lymphadenopathy.  Pulmonary:  Normal effort.  Clear to ausculation without rales, ronchi, or wheezing.  Cardiovascular:  Regular rate and rhythm without murmur, rubs or gallop.   Skin:  Warm and dry.  No obvious " lesions.  Musculoskeletal:  No extremity cyanosis, clubbing, or edema.      Assessment/Plan  1. Mixed hyperlipidemia  Chronic and improving.  Continue to take Atorvastatin 80 mg every evening.  Educated on a health diet and exercise.  Due for updated labs December 2022.  - Lipid Profile; Future    2. Prediabetes  Chronic and ongoing.  Educated on a healthy diet and exercise.  Due for updated labs December 2022.  - HEMOGLOBIN A1C; Future  - Comp Metabolic Panel; Future        Return in about 6 months (around 12/30/2022) for F/U Labs.    Please note that this dictation was created using voice recognition software. I have made every reasonable attempt to correct obvious errors, but I expect that there are errors of grammar and possibly content that I did not discover before finalizing the note.    MAURY Hernández  [facility]

## 2022-06-30 NOTE — ASSESSMENT & PLAN NOTE
Chronic and ongoing.  Currently working on his diet.     05/07/21 11:24 02/03/22 08:15 06/15/22 09:19   Glucose 141 (H) 101 (H) 108 (H)      01/11/21 08:03 04/19/21 10:35 02/03/22 08:15   Glycohemoglobin 5.8 (H) 5.8 (H) 6.2 (H)

## 2022-09-09 DIAGNOSIS — E78.49 OTHER HYPERLIPIDEMIA: ICD-10-CM

## 2022-09-09 RX ORDER — ATORVASTATIN CALCIUM 80 MG/1
TABLET, FILM COATED ORAL
Qty: 90 TABLET | Refills: 0 | Status: SHIPPED | OUTPATIENT
Start: 2022-09-09 | End: 2022-11-07

## 2022-10-12 DIAGNOSIS — F32.1 MODERATE SINGLE CURRENT EPISODE OF MAJOR DEPRESSIVE DISORDER (HCC): ICD-10-CM

## 2022-10-27 ENCOUNTER — TELEPHONE (OUTPATIENT)
Dept: HEALTH INFORMATION MANAGEMENT | Facility: OTHER | Age: 72
End: 2022-10-27
Payer: MEDICARE

## 2022-11-02 ENCOUNTER — PATIENT MESSAGE (OUTPATIENT)
Dept: HEALTH INFORMATION MANAGEMENT | Facility: OTHER | Age: 72
End: 2022-11-02

## 2022-11-07 ENCOUNTER — OFFICE VISIT (OUTPATIENT)
Dept: MEDICAL GROUP | Facility: PHYSICIAN GROUP | Age: 72
End: 2022-11-07
Payer: MEDICARE

## 2022-11-07 VITALS
DIASTOLIC BLOOD PRESSURE: 66 MMHG | BODY MASS INDEX: 26.4 KG/M2 | HEART RATE: 63 BPM | OXYGEN SATURATION: 96 % | TEMPERATURE: 98.7 F | WEIGHT: 154.6 LBS | RESPIRATION RATE: 16 BRPM | SYSTOLIC BLOOD PRESSURE: 114 MMHG | HEIGHT: 64 IN

## 2022-11-07 DIAGNOSIS — Z12.11 COLON CANCER SCREENING: ICD-10-CM

## 2022-11-07 DIAGNOSIS — Z23 NEED FOR VACCINATION: ICD-10-CM

## 2022-11-07 DIAGNOSIS — F33.42 RECURRENT MAJOR DEPRESSIVE DISORDER, IN FULL REMISSION (HCC): ICD-10-CM

## 2022-11-07 DIAGNOSIS — E78.2 MIXED HYPERLIPIDEMIA: ICD-10-CM

## 2022-11-07 PROCEDURE — G0008 ADMIN INFLUENZA VIRUS VAC: HCPCS

## 2022-11-07 PROCEDURE — 90662 IIV NO PRSV INCREASED AG IM: CPT

## 2022-11-07 PROCEDURE — 99214 OFFICE O/P EST MOD 30 MIN: CPT | Mod: 25

## 2022-11-07 RX ORDER — EZETIMIBE 10 MG/1
10 TABLET ORAL DAILY
Qty: 90 TABLET | Refills: 2 | Status: SHIPPED | OUTPATIENT
Start: 2022-11-07 | End: 2023-05-14 | Stop reason: SDUPTHER

## 2022-11-07 RX ORDER — ATORVASTATIN CALCIUM 40 MG/1
40 TABLET, FILM COATED ORAL NIGHTLY
Qty: 90 TABLET | Refills: 3 | Status: SHIPPED | OUTPATIENT
Start: 2022-11-07 | End: 2024-01-24

## 2022-11-07 ASSESSMENT — FIBROSIS 4 INDEX: FIB4 SCORE: 0.63

## 2022-11-07 NOTE — PROGRESS NOTES
"CC:   Chief Complaint   Patient presents with    Lab Results        HISTORY OF PRESENT ILLNESS: Patient is a 72 y.o. male established patient who presents today to discuss the following problems below:     Mixed hyperlipidemia  Patient continues on atorvastatin 80 mg nightly.  He presents for recheck of his lipids. He reports he has reduced his intake of sweets, is not drinking alcohol.  However, he is noticing that he is experiencing fairly severe persistent diarrhea at the 80 mg dose and would like to go back down to the 40 mg dose   Latest Reference Range & Units 06/15/22 09:19   Cholesterol,Tot 100 - 199 mg/dL 183   Triglycerides 0 - 149 mg/dL 136   HDL >=40 mg/dL 38 !   LDL <100 mg/dL 118 (H)   !: Data is abnormal  (H): Data is abnormally high    The 10-year ASCVD risk score (Amando MORIN, et al., 2019) is: 21.2%      Major depressive disorder  Patient continues on his sertraline 50 mg daily.  No refill is needed today    Past Medical History:   Diagnosis Date    Acute pain of left knee 11/1/2016    Cancer (HCC) 01/15    Prostate    Cold 01/04/15    chest congestion, afebrile,sore throat    Dental disorder     Partial upper    High cholesterol     Other specified disorder of intestines     Diarrhea    Snoring     No sleep study       Allergies:Bactrim ds    Review of Systems: Otherwise negative except for as stated above.      Exam: /66 (BP Location: Left arm, Patient Position: Sitting, BP Cuff Size: Adult)   Pulse 63   Temp 37.1 °C (98.7 °F) (Temporal)   Resp 16   Ht 1.626 m (5' 4\")   Wt 70.1 kg (154 lb 9.6 oz)   SpO2 96%  Body mass index is 26.54 kg/m².    Gen: Alert and oriented x4. Well developed, well-nourished male in no apparent distress.  Skin: Warm, dry, good turgor, no rashes in visible areas or lacerations appreciated.   Eye: EOM intact, pupils equal, round and reactive, conjunctiva clear, lids normal.  MSK: Normal gait, moves all extremities.  Neuro: Alert and oriented x 4, non-focal exam " with motor and sensory grossly intact.  Ext: No clubbing, cyanosis, edema.  Psych: Normal behavior, affect and mood.      Assessment/Plan:  72 y.o. male with the following -    1. Need for vaccination  - INFLUENZA VACCINE, HIGH DOSE (65+ ONLY)    2. Colon cancer screening  - Referral to GI for Colonoscopy    3. Mixed hyperlipidemia  Chronic condition, not at goal.  While LDL has improved, we will have to reduce the atorvastatin dose to 40 mg.  Discussed adding Zetia to supplement.  Recheck lipids and CMP in 4 months.  Patient given good Rx resource to see if filling medication this way would be cheaper  - ezetimibe (ZETIA) 10 MG Tab; Take 1 Tablet by mouth every day.  Dispense: 90 Tablet; Refill: 2  - atorvastatin (LIPITOR) 40 MG Tab; Take 1 Tablet by mouth every evening.  Dispense: 90 Tablet; Refill: 3  - Lipid Profile; Future  - Comp Metabolic Panel; Future    4. Current mild episode of major depressive disorder, unspecified whether recurrent (HCC)  Chronic condition, stable.  Continue Zoloft 50 mg daily      Follow-up: Return in about 4 months (around 3/7/2023).    Health Maintenance: Completed      Please note that this dictation was created using voice recognition software. I have made every reasonable attempt to correct obvious errors, but I expect that there are errors of grammar and possibly content that I did not discover before finalizing the note.    Electronically signed by TRICIA Law on November 7, 2022

## 2022-11-07 NOTE — ASSESSMENT & PLAN NOTE
Patient continues on atorvastatin 80 mg nightly.  He presents for recheck of his lipids. He reports he has reduced his intake of sweets, is not drinking alcohol.  However, he is noticing that he is experiencing fairly severe persistent diarrhea at the 80 mg dose and would like to go back down to the 40 mg dose   Latest Reference Range & Units 06/15/22 09:19   Cholesterol,Tot 100 - 199 mg/dL 183   Triglycerides 0 - 149 mg/dL 136   HDL >=40 mg/dL 38 !   LDL <100 mg/dL 118 (H)   !: Data is abnormal  (H): Data is abnormally high    The 10-year ASCVD risk score (Amando MORIN, et al., 2019) is: 21.2%

## 2022-12-05 ENCOUNTER — PATIENT MESSAGE (OUTPATIENT)
Dept: MEDICAL GROUP | Facility: PHYSICIAN GROUP | Age: 72
End: 2022-12-05
Payer: MEDICARE

## 2022-12-05 DIAGNOSIS — Z12.11 COLON CANCER SCREENING: ICD-10-CM

## 2023-02-25 ENCOUNTER — HOSPITAL ENCOUNTER (OUTPATIENT)
Dept: LAB | Facility: MEDICAL CENTER | Age: 73
End: 2023-02-25
Payer: MEDICARE

## 2023-02-25 DIAGNOSIS — E78.2 MIXED HYPERLIPIDEMIA: ICD-10-CM

## 2023-02-25 LAB
ALBUMIN SERPL BCP-MCNC: 4.5 G/DL (ref 3.2–4.9)
ALBUMIN/GLOB SERPL: 1.9 G/DL
ALP SERPL-CCNC: 90 U/L (ref 30–99)
ALT SERPL-CCNC: 38 U/L (ref 2–50)
ANION GAP SERPL CALC-SCNC: 10 MMOL/L (ref 7–16)
AST SERPL-CCNC: 26 U/L (ref 12–45)
BILIRUB SERPL-MCNC: 0.4 MG/DL (ref 0.1–1.5)
BUN SERPL-MCNC: 18 MG/DL (ref 8–22)
CALCIUM ALBUM COR SERPL-MCNC: 9.2 MG/DL (ref 8.5–10.5)
CALCIUM SERPL-MCNC: 9.6 MG/DL (ref 8.5–10.5)
CHLORIDE SERPL-SCNC: 109 MMOL/L (ref 96–112)
CHOLEST SERPL-MCNC: 137 MG/DL (ref 100–199)
CO2 SERPL-SCNC: 25 MMOL/L (ref 20–33)
CREAT SERPL-MCNC: 1.25 MG/DL (ref 0.5–1.4)
GFR SERPLBLD CREATININE-BSD FMLA CKD-EPI: 61 ML/MIN/1.73 M 2
GLOBULIN SER CALC-MCNC: 2.4 G/DL (ref 1.9–3.5)
GLUCOSE SERPL-MCNC: 118 MG/DL (ref 65–99)
HDLC SERPL-MCNC: 38 MG/DL
LDLC SERPL CALC-MCNC: 79 MG/DL
POTASSIUM SERPL-SCNC: 6 MMOL/L (ref 3.6–5.5)
PROT SERPL-MCNC: 6.9 G/DL (ref 6–8.2)
SODIUM SERPL-SCNC: 144 MMOL/L (ref 135–145)
TRIGL SERPL-MCNC: 101 MG/DL (ref 0–149)

## 2023-02-25 PROCEDURE — 80061 LIPID PANEL: CPT

## 2023-02-25 PROCEDURE — 80053 COMPREHEN METABOLIC PANEL: CPT

## 2023-02-25 PROCEDURE — 36415 COLL VENOUS BLD VENIPUNCTURE: CPT

## 2023-03-06 ENCOUNTER — OFFICE VISIT (OUTPATIENT)
Dept: MEDICAL GROUP | Facility: PHYSICIAN GROUP | Age: 73
End: 2023-03-06
Payer: MEDICARE

## 2023-03-06 VITALS
TEMPERATURE: 98.5 F | HEART RATE: 68 BPM | BODY MASS INDEX: 26.29 KG/M2 | HEIGHT: 64 IN | OXYGEN SATURATION: 97 % | SYSTOLIC BLOOD PRESSURE: 120 MMHG | RESPIRATION RATE: 18 BRPM | DIASTOLIC BLOOD PRESSURE: 78 MMHG | WEIGHT: 154 LBS

## 2023-03-06 DIAGNOSIS — F33.42 RECURRENT MAJOR DEPRESSIVE DISORDER, IN FULL REMISSION (HCC): ICD-10-CM

## 2023-03-06 DIAGNOSIS — E78.2 MIXED HYPERLIPIDEMIA: Chronic | ICD-10-CM

## 2023-03-06 DIAGNOSIS — E87.5 SERUM POTASSIUM ELEVATED: ICD-10-CM

## 2023-03-06 PROCEDURE — 99214 OFFICE O/P EST MOD 30 MIN: CPT

## 2023-03-06 RX ORDER — ATORVASTATIN CALCIUM 80 MG/1
TABLET, FILM COATED ORAL
COMMUNITY
End: 2023-03-06

## 2023-03-06 ASSESSMENT — PATIENT HEALTH QUESTIONNAIRE - PHQ9: CLINICAL INTERPRETATION OF PHQ2 SCORE: 0

## 2023-03-06 ASSESSMENT — FIBROSIS 4 INDEX: FIB4 SCORE: 1.26

## 2023-03-06 NOTE — PROGRESS NOTES
"CC:   Chief Complaint   Patient presents with    Lab Results        HISTORY OF PRESENT ILLNESS: Patient is a 73 y.o. male established patient who presents today to discuss the following problems below:     Mixed hyperlipidemia  Patient presents for follow-up on his labs.  He is a atorvastatin dose was reduced to 40 mg back in November 2022.  He was started on zetia 10mg daily but did not start this initially as he was not told it was ready. He has recently started this back up as of the beginning of February.  He was advised to repeat his labs in January 2023 to ensure that his LDL was still within goal.  Labs as below   Latest Reference Range & Units 02/25/23 08:01   Cholesterol,Tot 100 - 199 mg/dL 137   Triglycerides 0 - 149 mg/dL 101   HDL >=40 mg/dL 38 !   LDL <100 mg/dL 79   !: Data is abnormal    Recurrent major depressive disorder, in full remission (HCC)  Patient is continuing on on sertraline 50mg daily. He is not currently experiencing any depression or anxiety and feels this is well controlled. No refill needed today.     Review of Systems: Otherwise negative except for as stated above.      Exam: /78 (BP Location: Left arm, Patient Position: Sitting, BP Cuff Size: Adult)   Pulse 68   Temp 36.9 °C (98.5 °F) (Temporal)   Resp 18   Ht 1.626 m (5' 4\")   Wt 69.9 kg (154 lb)   SpO2 97%  Body mass index is 26.43 kg/m².    Physical Exam  Constitutional:       Appearance: Normal appearance.   Pulmonary:      Effort: Pulmonary effort is normal.   Musculoskeletal:      Cervical back: Normal range of motion and neck supple.   Lymphadenopathy:      Cervical: No cervical adenopathy.   Skin:     General: Skin is warm and dry.   Neurological:      General: No focal deficit present.      Mental Status: He is alert and oriented to person, place, and time.       Assessment/Plan:  73 y.o. male with the following -    1. Mixed hyperlipidemia  Chronic, stable.  LDL is well controlled on combination of atorvastatin " 40 mg nightly in addition to Zetia 10 mg daily.  He is not experiencing any diarrhea as a side effect from the increased atorvastatin at 80 mg dose.  Continue both these medications for now    2. Recurrent major depressive disorder, in full remission (HCC)  Chronic, stable.  Continue sertraline 50 mg daily.  No refill needed today    3. Serum potassium elevated  Reviewed labs with patient, he does have some elevated potassium at 6.  He reports he eats a banana every day.  Recommended that he eat half a banana per day or have a banana every other day, recheck a BMP in 2 weeks to ensure resolution  - Basic Metabolic Panel; Future    Follow-up: Return in about 1 year (around 3/6/2024) for AWV.    Health Maintenance: Completed      Please note that this dictation was created using voice recognition software. I have made every reasonable attempt to correct obvious errors, but I expect that there are errors of grammar and possibly content that I did not discover before finalizing the note.    Electronically signed by TRICIA Law on March 6, 2023

## 2023-03-06 NOTE — ASSESSMENT & PLAN NOTE
Patient presents for follow-up on his labs.  He is a atorvastatin dose was reduced to 40 mg back in November 2022.  He was started on zetia 10mg daily but did not start this initially as he was not told it was ready. He has recently started this back up as of the beginning of February.  He was advised to repeat his labs in January 2023 to ensure that his LDL was still within goal.  Labs as below   Latest Reference Range & Units 02/25/23 08:01   Cholesterol,Tot 100 - 199 mg/dL 137   Triglycerides 0 - 149 mg/dL 101   HDL >=40 mg/dL 38 !   LDL <100 mg/dL 79   !: Data is abnormal

## 2023-03-06 NOTE — ASSESSMENT & PLAN NOTE
Patient is continuing on on sertraline 50mg daily. He is not currently experiencing any depression or anxiety and feels this is well controlled. No refill needed today.

## 2023-03-21 ENCOUNTER — HOSPITAL ENCOUNTER (OUTPATIENT)
Dept: LAB | Facility: MEDICAL CENTER | Age: 73
End: 2023-03-21
Payer: MEDICARE

## 2023-03-21 DIAGNOSIS — E87.5 SERUM POTASSIUM ELEVATED: ICD-10-CM

## 2023-03-21 LAB
ANION GAP SERPL CALC-SCNC: 10 MMOL/L (ref 7–16)
BUN SERPL-MCNC: 16 MG/DL (ref 8–22)
CALCIUM SERPL-MCNC: 9.6 MG/DL (ref 8.5–10.5)
CHLORIDE SERPL-SCNC: 108 MMOL/L (ref 96–112)
CO2 SERPL-SCNC: 24 MMOL/L (ref 20–33)
CREAT SERPL-MCNC: 1.19 MG/DL (ref 0.5–1.4)
FASTING STATUS PATIENT QL REPORTED: NORMAL
GFR SERPLBLD CREATININE-BSD FMLA CKD-EPI: 64 ML/MIN/1.73 M 2
GLUCOSE SERPL-MCNC: 103 MG/DL (ref 65–99)
POTASSIUM SERPL-SCNC: 4.6 MMOL/L (ref 3.6–5.5)
SODIUM SERPL-SCNC: 142 MMOL/L (ref 135–145)

## 2023-03-21 PROCEDURE — 80048 BASIC METABOLIC PNL TOTAL CA: CPT

## 2023-03-21 PROCEDURE — 36415 COLL VENOUS BLD VENIPUNCTURE: CPT

## 2023-03-30 ENCOUNTER — TELEPHONE (OUTPATIENT)
Dept: HEALTH INFORMATION MANAGEMENT | Facility: OTHER | Age: 73
End: 2023-03-30

## 2023-05-14 DIAGNOSIS — E78.2 MIXED HYPERLIPIDEMIA: ICD-10-CM

## 2023-05-17 RX ORDER — EZETIMIBE 10 MG/1
10 TABLET ORAL DAILY
Qty: 90 TABLET | Refills: 0 | Status: SHIPPED | OUTPATIENT
Start: 2023-05-17 | End: 2023-08-27 | Stop reason: SDUPTHER

## 2023-07-05 DIAGNOSIS — F32.1 MODERATE SINGLE CURRENT EPISODE OF MAJOR DEPRESSIVE DISORDER (HCC): ICD-10-CM

## 2023-08-27 DIAGNOSIS — E78.2 MIXED HYPERLIPIDEMIA: ICD-10-CM

## 2023-08-30 RX ORDER — EZETIMIBE 10 MG/1
10 TABLET ORAL DAILY
Qty: 90 TABLET | Refills: 0 | Status: SHIPPED | OUTPATIENT
Start: 2023-08-30 | End: 2023-12-18 | Stop reason: SDUPTHER

## 2023-10-07 DIAGNOSIS — F32.1 MODERATE SINGLE CURRENT EPISODE OF MAJOR DEPRESSIVE DISORDER (HCC): ICD-10-CM

## 2023-12-18 DIAGNOSIS — E78.2 MIXED HYPERLIPIDEMIA: ICD-10-CM

## 2023-12-19 NOTE — TELEPHONE ENCOUNTER
Received request via: Patient    Was the patient seen in the last year in this department? Yes    Does the patient have an active prescription (recently filled or refills available) for medication(s) requested? No    Does the patient have shelter Plus and need 100 day supply (blood pressure, diabetes and cholesterol meds only)? Patient does not have SCP    Patient comment: This prescription needs to be filled as soon as possible.  I just used my last one this evening.  Thank you......

## 2023-12-20 RX ORDER — EZETIMIBE 10 MG/1
10 TABLET ORAL DAILY
Qty: 90 TABLET | Refills: 2 | Status: SHIPPED | OUTPATIENT
Start: 2023-12-20

## 2024-01-17 DIAGNOSIS — E78.2 MIXED HYPERLIPIDEMIA: ICD-10-CM

## 2024-01-24 RX ORDER — ATORVASTATIN CALCIUM 40 MG/1
40 TABLET, FILM COATED ORAL EVERY EVENING
Qty: 90 TABLET | Refills: 0 | Status: SHIPPED | OUTPATIENT
Start: 2024-01-24 | End: 2024-02-07 | Stop reason: SDUPTHER

## 2024-02-07 ENCOUNTER — OFFICE VISIT (OUTPATIENT)
Dept: MEDICAL GROUP | Facility: PHYSICIAN GROUP | Age: 74
End: 2024-02-07
Payer: MEDICARE

## 2024-02-07 VITALS
DIASTOLIC BLOOD PRESSURE: 62 MMHG | TEMPERATURE: 98.1 F | WEIGHT: 162.4 LBS | SYSTOLIC BLOOD PRESSURE: 126 MMHG | BODY MASS INDEX: 27.72 KG/M2 | HEIGHT: 64 IN | RESPIRATION RATE: 13 BRPM | HEART RATE: 66 BPM | OXYGEN SATURATION: 95 %

## 2024-02-07 DIAGNOSIS — E78.2 MIXED HYPERLIPIDEMIA: Chronic | ICD-10-CM

## 2024-02-07 DIAGNOSIS — Z12.11 SCREENING FOR COLORECTAL CANCER: ICD-10-CM

## 2024-02-07 DIAGNOSIS — Z12.12 SCREENING FOR COLORECTAL CANCER: ICD-10-CM

## 2024-02-07 DIAGNOSIS — F33.42 RECURRENT MAJOR DEPRESSIVE DISORDER, IN FULL REMISSION (HCC): ICD-10-CM

## 2024-02-07 DIAGNOSIS — I70.0 CALCIFICATION OF AORTA (HCC): ICD-10-CM

## 2024-02-07 DIAGNOSIS — Z11.4 SCREENING FOR HIV (HUMAN IMMUNODEFICIENCY VIRUS): ICD-10-CM

## 2024-02-07 DIAGNOSIS — Z85.46 HISTORY OF PROSTATE CANCER: ICD-10-CM

## 2024-02-07 DIAGNOSIS — Z11.59 NEED FOR HEPATITIS C SCREENING TEST: ICD-10-CM

## 2024-02-07 PROCEDURE — 99214 OFFICE O/P EST MOD 30 MIN: CPT

## 2024-02-07 PROCEDURE — 3074F SYST BP LT 130 MM HG: CPT

## 2024-02-07 PROCEDURE — 3078F DIAST BP <80 MM HG: CPT

## 2024-02-07 RX ORDER — ATORVASTATIN CALCIUM 40 MG/1
40 TABLET, FILM COATED ORAL EVERY EVENING
Qty: 90 TABLET | Refills: 3 | Status: SHIPPED | OUTPATIENT
Start: 2024-02-07

## 2024-02-07 ASSESSMENT — PATIENT HEALTH QUESTIONNAIRE - PHQ9
5. POOR APPETITE OR OVEREATING: NOT AT ALL
6. FEELING BAD ABOUT YOURSELF - OR THAT YOU ARE A FAILURE OR HAVE LET YOURSELF OR YOUR FAMILY DOWN: NOT AL ALL
7. TROUBLE CONCENTRATING ON THINGS, SUCH AS READING THE NEWSPAPER OR WATCHING TELEVISION: NOT AT ALL
9. THOUGHTS THAT YOU WOULD BE BETTER OFF DEAD, OR OF HURTING YOURSELF: NOT AT ALL
SUM OF ALL RESPONSES TO PHQ9 QUESTIONS 1 AND 2: 0
8. MOVING OR SPEAKING SO SLOWLY THAT OTHER PEOPLE COULD HAVE NOTICED. OR THE OPPOSITE, BEING SO FIGETY OR RESTLESS THAT YOU HAVE BEEN MOVING AROUND A LOT MORE THAN USUAL: NOT AT ALL
SUM OF ALL RESPONSES TO PHQ QUESTIONS 1-9: 0
1. LITTLE INTEREST OR PLEASURE IN DOING THINGS: NOT AT ALL
4. FEELING TIRED OR HAVING LITTLE ENERGY: NOT AT ALL
2. FEELING DOWN, DEPRESSED, IRRITABLE, OR HOPELESS: NOT AT ALL
3. TROUBLE FALLING OR STAYING ASLEEP OR SLEEPING TOO MUCH: NOT AT ALL

## 2024-02-07 NOTE — PROGRESS NOTES
CC:   Chief Complaint   Patient presents with    Medication Refill     atorvastatin        HPI: Patient is a 73 y.o. male presenting with the following issues:     Problem List Items Addressed This Visit       History of prostate cancer     This is a chronic, stable medical condition.     History of prostate cancer s/p resection, now undergoing annual surveillance with urology [facility]. Last PSA checked in June of 2023, was previously undetectable but did slightly increase to 0.2.          Mixed hyperlipidemia (Chronic)     Patient is currently on atorvastatin 40 mg nightly in combination with Zetia 10 mg daily.  Last lipids were in February 2023, well-controlled with LDL at 79.  He is due for updated labs. He needs his atorvastatin refilled today.          Relevant Medications    atorvastatin (LIPITOR) 40 MG Tab    Other Relevant Orders    Lipid Profile    Recurrent major depressive disorder, in full remission (HCC)     This is a chronic, stable medical condition.   Patient is currently on Zoloft 50 mg daily for management of recurrent major depressive disorder.  He does need a refill of this today.         Relevant Medications    sertraline (ZOLOFT) 50 MG Tab    Other Relevant Orders    Comp Metabolic Panel    CBC WITHOUT DIFFERENTIAL    Calcification of aorta (HCC)     Chronic finding, new to me. On atorvastatin and zetia 10mg daily.   Noted on x-ray of lumbar spine 2/5/2024         Relevant Medications    atorvastatin (LIPITOR) 40 MG Tab     Other Visit Diagnoses       Screening for colorectal cancer        Relevant Orders    Referral to GI for Colonoscopy    Need for hepatitis C screening test        Relevant Orders    HEP C VIRUS ANTIBODY    Screening for HIV (human immunodeficiency virus)        Relevant Orders    HIV AG/AB COMBO ASSAY SCREENING            Patient Active Problem List    Diagnosis Date Noted    Calcification of aorta (HCC) 02/07/2024    Gross hematuria 01/18/2022    Renal insufficiency 01/18/2022     "Rash 05/07/2021    Pain in right testicle 04/22/2021    Prediabetes 10/12/2020    Vitamin D deficiency 09/10/2020    Seborrheic keratoses 09/10/2020    Fatigue 09/10/2020    Vertigo 08/19/2019    Smoker 08/19/2019    Elevated glucose 07/28/2016    Mixed hyperlipidemia 06/07/2016    Recurrent major depressive disorder, in full remission (HCC) 06/07/2016    Erectile dysfunction 06/07/2016    History of prostate cancer 01/20/2015       Current Outpatient Medications   Medication Sig Dispense Refill    atorvastatin (LIPITOR) 40 MG Tab Take 1 Tablet by mouth every evening. 90 Tablet 3    sertraline (ZOLOFT) 50 MG Tab Take 1 Tablet by mouth every day. 90 Tablet 3    meloxicam (MOBIC) 15 MG tablet Take 1 Tablet by mouth every day. 30 Tablet 0    methylPREDNISolone (MEDROL DOSEPAK) 4 MG Tablet Therapy Pack Follow schedule on package instructions. 21 Tablet 0    cyclobenzaprine (FLEXERIL) 10 mg Tab Take 1 Tablet by mouth 3 times a day as needed for Moderate Pain for up to 10 days. 30 Tablet 0    ezetimibe (ZETIA) 10 MG Tab Take 1 Tablet by mouth every day. 90 Tablet 2    Papav-Phentolamine-Alprostadil 30-1-0.02 MG/ML Solution       Psyllium (METAMUCIL FIBER PO) Take  by mouth.      Cholecalciferol (VITAMIN D) 2000 UNIT TABS Take 1 Tablet by mouth every evening.      therapeutic multivitamin-minerals (THERAGRAN-M) TABS Take 1 Tablet by mouth every day.       No current facility-administered medications for this visit.       Allergies as of 02/07/2024 - Reviewed 02/07/2024   Allergen Reaction Noted    Bactrim ds  05/07/2021       Review of Systems: Otherwise negative except for as stated above.      Objective/Assessment:    Exam: /62   Pulse 66   Temp 36.7 °C (98.1 °F) (Temporal)   Resp 13   Ht 1.626 m (5' 4\")   Wt 73.7 kg (162 lb 6.4 oz)   SpO2 95%  Body mass index is 27.88 kg/m².    Physical Exam  Vitals reviewed.   Constitutional:       Appearance: Normal appearance.   Eyes:      Extraocular Movements: " Extraocular movements intact.   Pulmonary:      Effort: Pulmonary effort is normal.   Neurological:      General: No focal deficit present.      Mental Status: He is alert and oriented to person, place, and time.   Psychiatric:         Mood and Affect: Mood normal.         Behavior: Behavior normal.         Follow-up: Return in about 3 months (around 5/7/2024).    Health Maintenance: Completed      Please note that this dictation was created using voice recognition software. I have made every reasonable attempt to correct obvious errors, but I expect that there are errors of grammar and possibly content that I did not discover before finalizing the note.    Electronically signed by TRICIA Law on February 7, 2024

## 2024-02-07 NOTE — ASSESSMENT & PLAN NOTE
Chronic finding, new to me. On atorvastatin and zetia 10mg daily.   Noted on x-ray of lumbar spine 2/5/2024

## 2024-02-07 NOTE — ASSESSMENT & PLAN NOTE
Patient is currently on atorvastatin 40 mg nightly in combination with Zetia 10 mg daily.  Last lipids were in February 2023, well-controlled with LDL at 79.  He is due for updated labs. He needs his atorvastatin refilled today.

## 2024-02-07 NOTE — ASSESSMENT & PLAN NOTE
This is a chronic, stable medical condition.   Patient is currently on Zoloft 50 mg daily for management of recurrent major depressive disorder.  He does need a refill of this today.

## 2024-02-07 NOTE — ASSESSMENT & PLAN NOTE
This is a chronic, stable medical condition.     History of prostate cancer s/p resection, now undergoing annual surveillance with urology [place]. Last PSA checked in June of 2023, was previously undetectable but did slightly increase to 0.2.

## 2024-02-21 ENCOUNTER — HOSPITAL ENCOUNTER (OUTPATIENT)
Dept: LAB | Facility: MEDICAL CENTER | Age: 74
End: 2024-02-21
Payer: MEDICARE

## 2024-02-21 DIAGNOSIS — Z11.59 NEED FOR HEPATITIS C SCREENING TEST: ICD-10-CM

## 2024-02-21 DIAGNOSIS — F33.42 RECURRENT MAJOR DEPRESSIVE DISORDER, IN FULL REMISSION (HCC): ICD-10-CM

## 2024-02-21 DIAGNOSIS — Z11.4 SCREENING FOR HIV (HUMAN IMMUNODEFICIENCY VIRUS): ICD-10-CM

## 2024-02-21 DIAGNOSIS — E78.2 MIXED HYPERLIPIDEMIA: Chronic | ICD-10-CM

## 2024-02-21 LAB
ALBUMIN SERPL BCP-MCNC: 4.3 G/DL (ref 3.2–4.9)
ALBUMIN/GLOB SERPL: 1.5 G/DL
ALP SERPL-CCNC: 87 U/L (ref 30–99)
ALT SERPL-CCNC: 31 U/L (ref 2–50)
ANION GAP SERPL CALC-SCNC: 10 MMOL/L (ref 7–16)
AST SERPL-CCNC: 21 U/L (ref 12–45)
BILIRUB SERPL-MCNC: 0.3 MG/DL (ref 0.1–1.5)
BUN SERPL-MCNC: 16 MG/DL (ref 8–22)
CALCIUM ALBUM COR SERPL-MCNC: 9.1 MG/DL (ref 8.5–10.5)
CALCIUM SERPL-MCNC: 9.3 MG/DL (ref 8.5–10.5)
CHLORIDE SERPL-SCNC: 104 MMOL/L (ref 96–112)
CHOLEST SERPL-MCNC: 166 MG/DL (ref 100–199)
CO2 SERPL-SCNC: 23 MMOL/L (ref 20–33)
CREAT SERPL-MCNC: 1.21 MG/DL (ref 0.5–1.4)
ERYTHROCYTE [DISTWIDTH] IN BLOOD BY AUTOMATED COUNT: 42.2 FL (ref 35.9–50)
GFR SERPLBLD CREATININE-BSD FMLA CKD-EPI: 63 ML/MIN/1.73 M 2
GLOBULIN SER CALC-MCNC: 2.8 G/DL (ref 1.9–3.5)
GLUCOSE SERPL-MCNC: 99 MG/DL (ref 65–99)
HCT VFR BLD AUTO: 47.7 % (ref 42–52)
HCV AB SER QL: NORMAL
HDLC SERPL-MCNC: 47 MG/DL
HGB BLD-MCNC: 16.3 G/DL (ref 14–18)
HIV 1+2 AB+HIV1 P24 AG SERPL QL IA: NORMAL
LDLC SERPL CALC-MCNC: 96 MG/DL
MCH RBC QN AUTO: 31.3 PG (ref 27–33)
MCHC RBC AUTO-ENTMCNC: 34.2 G/DL (ref 32.3–36.5)
MCV RBC AUTO: 91.7 FL (ref 81.4–97.8)
PLATELET # BLD AUTO: 193 K/UL (ref 164–446)
PMV BLD AUTO: 10.7 FL (ref 9–12.9)
POTASSIUM SERPL-SCNC: 4.7 MMOL/L (ref 3.6–5.5)
PROT SERPL-MCNC: 7.1 G/DL (ref 6–8.2)
RBC # BLD AUTO: 5.2 M/UL (ref 4.7–6.1)
SODIUM SERPL-SCNC: 137 MMOL/L (ref 135–145)
TRIGL SERPL-MCNC: 115 MG/DL (ref 0–149)
WBC # BLD AUTO: 12.9 K/UL (ref 4.8–10.8)

## 2024-02-21 PROCEDURE — 80061 LIPID PANEL: CPT

## 2024-02-21 PROCEDURE — 85027 COMPLETE CBC AUTOMATED: CPT

## 2024-02-21 PROCEDURE — G0475 HIV COMBINATION ASSAY: HCPCS | Mod: GA

## 2024-02-21 PROCEDURE — 80053 COMPREHEN METABOLIC PANEL: CPT

## 2024-02-21 PROCEDURE — 36415 COLL VENOUS BLD VENIPUNCTURE: CPT | Mod: GA

## 2024-02-21 PROCEDURE — 86803 HEPATITIS C AB TEST: CPT

## 2024-02-27 PROBLEM — D49.7 INTRADURAL EXTRAMEDULLARY SPINAL TUMOR: Status: ACTIVE | Noted: 2024-02-22

## 2024-02-28 ENCOUNTER — APPOINTMENT (OUTPATIENT)
Dept: ADMISSIONS | Facility: MEDICAL CENTER | Age: 74
DRG: 983 | End: 2024-02-28
Attending: NEUROLOGICAL SURGERY
Payer: MEDICARE

## 2024-03-05 ENCOUNTER — PRE-ADMISSION TESTING (OUTPATIENT)
Dept: ADMISSIONS | Facility: MEDICAL CENTER | Age: 74
DRG: 983 | End: 2024-03-05
Attending: NEUROLOGICAL SURGERY
Payer: MEDICARE

## 2024-03-05 RX ORDER — ASCORBIC ACID 250 MG
1 TABLET,CHEWABLE ORAL DAILY
Status: ON HOLD | COMMUNITY
End: 2024-04-16

## 2024-03-05 RX ORDER — NAPHAZOLINE HCL 0.012 %
1 DROPS OPHTHALMIC (EYE) EVERY 8 HOURS PRN
Status: ON HOLD | COMMUNITY
End: 2024-04-16

## 2024-03-19 ENCOUNTER — PRE-ADMISSION TESTING (OUTPATIENT)
Dept: ADMISSIONS | Facility: MEDICAL CENTER | Age: 74
DRG: 983 | End: 2024-03-19
Attending: NEUROLOGICAL SURGERY
Payer: MEDICARE

## 2024-03-19 ENCOUNTER — HOSPITAL ENCOUNTER (OUTPATIENT)
Dept: RADIOLOGY | Facility: MEDICAL CENTER | Age: 74
End: 2024-03-19
Attending: NEUROLOGICAL SURGERY | Admitting: NEUROLOGICAL SURGERY
Payer: MEDICARE

## 2024-03-19 DIAGNOSIS — M79.2 NEUROPATHIC PAIN: ICD-10-CM

## 2024-03-19 LAB
25(OH)D3 SERPL-MCNC: 34 NG/ML (ref 30–100)
ABO GROUP BLD: NORMAL
ANION GAP SERPL CALC-SCNC: 11 MMOL/L (ref 7–16)
APPEARANCE UR: CLEAR
APTT PPP: 32.2 SEC (ref 24.7–36)
BASOPHILS # BLD AUTO: 0.3 % (ref 0–1.8)
BASOPHILS # BLD: 0.03 K/UL (ref 0–0.12)
BILIRUB UR QL STRIP.AUTO: NEGATIVE
BLD GP AB SCN SERPL QL: NORMAL
BUN SERPL-MCNC: 17 MG/DL (ref 8–22)
CALCIUM SERPL-MCNC: 9.5 MG/DL (ref 8.5–10.5)
CHLORIDE SERPL-SCNC: 105 MMOL/L (ref 96–112)
CO2 SERPL-SCNC: 24 MMOL/L (ref 20–33)
COLOR UR: YELLOW
CREAT SERPL-MCNC: 1.2 MG/DL (ref 0.5–1.4)
EKG IMPRESSION: NORMAL
EOSINOPHIL # BLD AUTO: 0.12 K/UL (ref 0–0.51)
EOSINOPHIL NFR BLD: 1.4 % (ref 0–6.9)
ERYTHROCYTE [DISTWIDTH] IN BLOOD BY AUTOMATED COUNT: 39.9 FL (ref 35.9–50)
GFR SERPLBLD CREATININE-BSD FMLA CKD-EPI: 63 ML/MIN/1.73 M 2
GLUCOSE SERPL-MCNC: 101 MG/DL (ref 65–99)
GLUCOSE UR STRIP.AUTO-MCNC: NEGATIVE MG/DL
HCT VFR BLD AUTO: 42.5 % (ref 42–52)
HGB BLD-MCNC: 14.7 G/DL (ref 14–18)
IMM GRANULOCYTES # BLD AUTO: 0.04 K/UL (ref 0–0.11)
IMM GRANULOCYTES NFR BLD AUTO: 0.5 % (ref 0–0.9)
INR PPP: 0.97 (ref 0.87–1.13)
KETONES UR STRIP.AUTO-MCNC: NEGATIVE MG/DL
LEUKOCYTE ESTERASE UR QL STRIP.AUTO: NEGATIVE
LYMPHOCYTES # BLD AUTO: 1.41 K/UL (ref 1–4.8)
LYMPHOCYTES NFR BLD: 15.9 % (ref 22–41)
MCH RBC QN AUTO: 30.8 PG (ref 27–33)
MCHC RBC AUTO-ENTMCNC: 34.6 G/DL (ref 32.3–36.5)
MCV RBC AUTO: 88.9 FL (ref 81.4–97.8)
MICRO URNS: NORMAL
MONOCYTES # BLD AUTO: 0.78 K/UL (ref 0–0.85)
MONOCYTES NFR BLD AUTO: 8.8 % (ref 0–13.4)
NEUTROPHILS # BLD AUTO: 6.5 K/UL (ref 1.82–7.42)
NEUTROPHILS NFR BLD: 73.1 % (ref 44–72)
NITRITE UR QL STRIP.AUTO: NEGATIVE
NRBC # BLD AUTO: 0 K/UL
NRBC BLD-RTO: 0 /100 WBC (ref 0–0.2)
PH UR STRIP.AUTO: 5.5 [PH] (ref 5–8)
PLATELET # BLD AUTO: 206 K/UL (ref 164–446)
PMV BLD AUTO: 10.3 FL (ref 9–12.9)
POTASSIUM SERPL-SCNC: 5.1 MMOL/L (ref 3.6–5.5)
PROT UR QL STRIP: NEGATIVE MG/DL
PROTHROMBIN TIME: 13 SEC (ref 12–14.6)
RBC # BLD AUTO: 4.78 M/UL (ref 4.7–6.1)
RBC UR QL AUTO: NEGATIVE
RH BLD: NORMAL
SODIUM SERPL-SCNC: 140 MMOL/L (ref 135–145)
SP GR UR STRIP.AUTO: 1.02
UROBILINOGEN UR STRIP.AUTO-MCNC: 0.2 MG/DL
WBC # BLD AUTO: 8.9 K/UL (ref 4.8–10.8)

## 2024-03-19 PROCEDURE — 81003 URINALYSIS AUTO W/O SCOPE: CPT

## 2024-03-19 PROCEDURE — 85025 COMPLETE CBC W/AUTO DIFF WBC: CPT

## 2024-03-19 PROCEDURE — 93010 ELECTROCARDIOGRAM REPORT: CPT | Performed by: INTERNAL MEDICINE

## 2024-03-19 PROCEDURE — 71046 X-RAY EXAM CHEST 2 VIEWS: CPT

## 2024-03-19 PROCEDURE — 86900 BLOOD TYPING SEROLOGIC ABO: CPT

## 2024-03-19 PROCEDURE — 85610 PROTHROMBIN TIME: CPT

## 2024-03-19 PROCEDURE — 83036 HEMOGLOBIN GLYCOSYLATED A1C: CPT | Mod: GA

## 2024-03-19 PROCEDURE — 82306 VITAMIN D 25 HYDROXY: CPT

## 2024-03-19 PROCEDURE — 80048 BASIC METABOLIC PNL TOTAL CA: CPT

## 2024-03-19 PROCEDURE — 85730 THROMBOPLASTIN TIME PARTIAL: CPT

## 2024-03-19 PROCEDURE — 36415 COLL VENOUS BLD VENIPUNCTURE: CPT

## 2024-03-19 PROCEDURE — 93005 ELECTROCARDIOGRAM TRACING: CPT

## 2024-03-19 PROCEDURE — 86901 BLOOD TYPING SEROLOGIC RH(D): CPT

## 2024-03-19 PROCEDURE — 86850 RBC ANTIBODY SCREEN: CPT

## 2024-03-20 LAB
EST. AVERAGE GLUCOSE BLD GHB EST-MCNC: 126 MG/DL
HBA1C MFR BLD: 6 % (ref 4–5.6)

## 2024-03-29 ENCOUNTER — HOSPITAL ENCOUNTER (OUTPATIENT)
Dept: RADIOLOGY | Facility: MEDICAL CENTER | Age: 74
End: 2024-03-29
Attending: NEUROLOGICAL SURGERY
Payer: MEDICARE

## 2024-03-29 DIAGNOSIS — M79.2 NEUROPATHIC PAIN: ICD-10-CM

## 2024-03-29 DIAGNOSIS — D49.7 INTRADURAL EXTRAMEDULLARY SPINAL TUMOR: ICD-10-CM

## 2024-03-29 DIAGNOSIS — M54.50 LOW BACK PAIN, UNSPECIFIED BACK PAIN LATERALITY, UNSPECIFIED CHRONICITY, UNSPECIFIED WHETHER SCIATICA PRESENT: ICD-10-CM

## 2024-03-29 DIAGNOSIS — M48.062 SPINAL STENOSIS OF LUMBAR REGION WITH NEUROGENIC CLAUDICATION: ICD-10-CM

## 2024-03-29 PROCEDURE — 72156 MRI NECK SPINE W/O & W/DYE: CPT

## 2024-03-29 PROCEDURE — A9579 GAD-BASE MR CONTRAST NOS,1ML: HCPCS | Performed by: NEUROLOGICAL SURGERY

## 2024-03-29 PROCEDURE — 700117 HCHG RX CONTRAST REV CODE 255: Performed by: NEUROLOGICAL SURGERY

## 2024-03-29 PROCEDURE — 70553 MRI BRAIN STEM W/O & W/DYE: CPT

## 2024-03-29 RX ADMIN — GADOTERIDOL 15 ML: 279.3 INJECTION, SOLUTION INTRAVENOUS at 08:47

## 2024-04-03 ENCOUNTER — HOSPITAL ENCOUNTER (OUTPATIENT)
Dept: RADIOLOGY | Facility: MEDICAL CENTER | Age: 74
End: 2024-04-03
Attending: NEUROLOGICAL SURGERY
Payer: MEDICARE

## 2024-04-03 DIAGNOSIS — M48.062 SPINAL STENOSIS OF LUMBAR REGION WITH NEUROGENIC CLAUDICATION: ICD-10-CM

## 2024-04-03 DIAGNOSIS — M79.2 NEUROPATHIC PAIN: ICD-10-CM

## 2024-04-03 DIAGNOSIS — D49.7 INTRADURAL EXTRAMEDULLARY SPINAL TUMOR: ICD-10-CM

## 2024-04-03 DIAGNOSIS — M54.50 LOW BACK PAIN, UNSPECIFIED BACK PAIN LATERALITY, UNSPECIFIED CHRONICITY, UNSPECIFIED WHETHER SCIATICA PRESENT: ICD-10-CM

## 2024-04-03 PROCEDURE — 700117 HCHG RX CONTRAST REV CODE 255: Performed by: NEUROLOGICAL SURGERY

## 2024-04-03 PROCEDURE — 72158 MRI LUMBAR SPINE W/O & W/DYE: CPT

## 2024-04-03 PROCEDURE — 72157 MRI CHEST SPINE W/O & W/DYE: CPT

## 2024-04-03 PROCEDURE — A9579 GAD-BASE MR CONTRAST NOS,1ML: HCPCS | Performed by: NEUROLOGICAL SURGERY

## 2024-04-03 RX ADMIN — GADOTERIDOL 15 ML: 279.3 INJECTION, SOLUTION INTRAVENOUS at 10:36

## 2024-04-14 ENCOUNTER — ANESTHESIA EVENT (OUTPATIENT)
Dept: SURGERY | Facility: MEDICAL CENTER | Age: 74
DRG: 983 | End: 2024-04-14
Payer: MEDICARE

## 2024-04-15 ENCOUNTER — APPOINTMENT (OUTPATIENT)
Dept: RADIOLOGY | Facility: MEDICAL CENTER | Age: 74
DRG: 983 | End: 2024-04-15
Attending: NEUROLOGICAL SURGERY
Payer: MEDICARE

## 2024-04-15 ENCOUNTER — ANESTHESIA (OUTPATIENT)
Dept: SURGERY | Facility: MEDICAL CENTER | Age: 74
DRG: 983 | End: 2024-04-15
Payer: MEDICARE

## 2024-04-15 ENCOUNTER — HOSPITAL ENCOUNTER (INPATIENT)
Facility: MEDICAL CENTER | Age: 74
LOS: 1 days | DRG: 983 | End: 2024-04-16
Attending: NEUROLOGICAL SURGERY | Admitting: NEUROLOGICAL SURGERY
Payer: MEDICARE

## 2024-04-15 DIAGNOSIS — G89.18 POSTOPERATIVE PAIN: ICD-10-CM

## 2024-04-15 DIAGNOSIS — D49.7 INTRADURAL EXTRAMEDULLARY SPINAL TUMOR: ICD-10-CM

## 2024-04-15 LAB
ABO + RH BLD: NORMAL
PATHOLOGY CONSULT NOTE: NORMAL

## 2024-04-15 PROCEDURE — 110371 HCHG SHELL REV 272: Performed by: NEUROLOGICAL SURGERY

## 2024-04-15 PROCEDURE — 160029 HCHG SURGERY MINUTES - 1ST 30 MINS LEVEL 4: Performed by: NEUROLOGICAL SURGERY

## 2024-04-15 PROCEDURE — 160035 HCHG PACU - 1ST 60 MINS PHASE I: Performed by: NEUROLOGICAL SURGERY

## 2024-04-15 PROCEDURE — 160036 HCHG PACU - EA ADDL 30 MINS PHASE I: Performed by: NEUROLOGICAL SURGERY

## 2024-04-15 PROCEDURE — 770001 HCHG ROOM/CARE - MED/SURG/GYN PRIV*

## 2024-04-15 PROCEDURE — 63277 BX/EXC XDRL SPINE LESN LMBR: CPT | Performed by: NEUROLOGICAL SURGERY

## 2024-04-15 PROCEDURE — 700111 HCHG RX REV CODE 636 W/ 250 OVERRIDE (IP): Mod: JZ | Performed by: STUDENT IN AN ORGANIZED HEALTH CARE EDUCATION/TRAINING PROGRAM

## 2024-04-15 PROCEDURE — 72100 X-RAY EXAM L-S SPINE 2/3 VWS: CPT

## 2024-04-15 PROCEDURE — 700111 HCHG RX REV CODE 636 W/ 250 OVERRIDE (IP): Performed by: STUDENT IN AN ORGANIZED HEALTH CARE EDUCATION/TRAINING PROGRAM

## 2024-04-15 PROCEDURE — 88309 TISSUE EXAM BY PATHOLOGIST: CPT

## 2024-04-15 PROCEDURE — 63277 BX/EXC XDRL SPINE LESN LMBR: CPT | Mod: ASROC | Performed by: PHYSICIAN ASSISTANT

## 2024-04-15 PROCEDURE — 700111 HCHG RX REV CODE 636 W/ 250 OVERRIDE (IP): Performed by: NEUROLOGICAL SURGERY

## 2024-04-15 PROCEDURE — 700102 HCHG RX REV CODE 250 W/ 637 OVERRIDE(OP): Performed by: PHYSICIAN ASSISTANT

## 2024-04-15 PROCEDURE — A9270 NON-COVERED ITEM OR SERVICE: HCPCS | Performed by: STUDENT IN AN ORGANIZED HEALTH CARE EDUCATION/TRAINING PROGRAM

## 2024-04-15 PROCEDURE — 69990 MICROSURGERY ADD-ON: CPT | Mod: ASROC | Performed by: PHYSICIAN ASSISTANT

## 2024-04-15 PROCEDURE — 88307 TISSUE EXAM BY PATHOLOGIST: CPT

## 2024-04-15 PROCEDURE — 700102 HCHG RX REV CODE 250 W/ 637 OVERRIDE(OP): Performed by: STUDENT IN AN ORGANIZED HEALTH CARE EDUCATION/TRAINING PROGRAM

## 2024-04-15 PROCEDURE — 700101 HCHG RX REV CODE 250: Performed by: STUDENT IN AN ORGANIZED HEALTH CARE EDUCATION/TRAINING PROGRAM

## 2024-04-15 PROCEDURE — 88342 IMHCHEM/IMCYTCHM 1ST ANTB: CPT

## 2024-04-15 PROCEDURE — 700105 HCHG RX REV CODE 258: Performed by: PHYSICIAN ASSISTANT

## 2024-04-15 PROCEDURE — 160048 HCHG OR STATISTICAL LEVEL 1-5: Performed by: NEUROLOGICAL SURGERY

## 2024-04-15 PROCEDURE — 160009 HCHG ANES TIME/MIN: Performed by: NEUROLOGICAL SURGERY

## 2024-04-15 PROCEDURE — 95940 IONM IN OPERATNG ROOM 15 MIN: CPT | Performed by: NEUROLOGICAL SURGERY

## 2024-04-15 PROCEDURE — 88341 IMHCHEM/IMCYTCHM EA ADD ANTB: CPT

## 2024-04-15 PROCEDURE — 700105 HCHG RX REV CODE 258: Performed by: NEUROLOGICAL SURGERY

## 2024-04-15 PROCEDURE — 110454 HCHG SHELL REV 250: Performed by: NEUROLOGICAL SURGERY

## 2024-04-15 PROCEDURE — 01NB0ZZ RELEASE LUMBAR NERVE, OPEN APPROACH: ICD-10-PCS | Performed by: NEUROLOGICAL SURGERY

## 2024-04-15 PROCEDURE — 700106 HCHG RX REV CODE 271: Performed by: NEUROLOGICAL SURGERY

## 2024-04-15 PROCEDURE — A9270 NON-COVERED ITEM OR SERVICE: HCPCS | Performed by: PHYSICIAN ASSISTANT

## 2024-04-15 PROCEDURE — 700101 HCHG RX REV CODE 250: Performed by: PHYSICIAN ASSISTANT

## 2024-04-15 PROCEDURE — 95938 SOMATOSENSORY TESTING: CPT | Performed by: NEUROLOGICAL SURGERY

## 2024-04-15 PROCEDURE — 160002 HCHG RECOVERY MINUTES (STAT): Performed by: NEUROLOGICAL SURGERY

## 2024-04-15 PROCEDURE — 69990 MICROSURGERY ADD-ON: CPT | Performed by: NEUROLOGICAL SURGERY

## 2024-04-15 PROCEDURE — A4306 DRUG DELIVERY SYSTEM <=50 ML: HCPCS | Performed by: NEUROLOGICAL SURGERY

## 2024-04-15 PROCEDURE — 95937 NEUROMUSCULAR JUNCTION TEST: CPT | Performed by: NEUROLOGICAL SURGERY

## 2024-04-15 PROCEDURE — 160041 HCHG SURGERY MINUTES - EA ADDL 1 MIN LEVEL 4: Performed by: NEUROLOGICAL SURGERY

## 2024-04-15 PROCEDURE — 700101 HCHG RX REV CODE 250: Performed by: NEUROLOGICAL SURGERY

## 2024-04-15 PROCEDURE — 700111 HCHG RX REV CODE 636 W/ 250 OVERRIDE (IP): Performed by: PHYSICIAN ASSISTANT

## 2024-04-15 PROCEDURE — 95939 C MOTOR EVOKED UPR&LWR LIMBS: CPT | Performed by: NEUROLOGICAL SURGERY

## 2024-04-15 PROCEDURE — 95861 NEEDLE EMG 2 EXTREMITIES: CPT | Performed by: NEUROLOGICAL SURGERY

## 2024-04-15 PROCEDURE — 00BT0ZZ EXCISION OF SPINAL MENINGES, OPEN APPROACH: ICD-10-PCS | Performed by: NEUROLOGICAL SURGERY

## 2024-04-15 RX ORDER — BUPIVACAINE HYDROCHLORIDE AND EPINEPHRINE 5; 5 MG/ML; UG/ML
INJECTION, SOLUTION EPIDURAL; INTRACAUDAL; PERINEURAL
Status: DISCONTINUED | OUTPATIENT
Start: 2024-04-15 | End: 2024-04-15 | Stop reason: HOSPADM

## 2024-04-15 RX ORDER — DEXAMETHASONE SODIUM PHOSPHATE 4 MG/ML
4 INJECTION, SOLUTION INTRA-ARTICULAR; INTRALESIONAL; INTRAMUSCULAR; INTRAVENOUS; SOFT TISSUE EVERY 6 HOURS
Status: DISCONTINUED | OUTPATIENT
Start: 2024-04-15 | End: 2024-04-16 | Stop reason: HOSPADM

## 2024-04-15 RX ORDER — OXYCODONE HCL 5 MG/5 ML
5 SOLUTION, ORAL ORAL
Status: COMPLETED | OUTPATIENT
Start: 2024-04-15 | End: 2024-04-15

## 2024-04-15 RX ORDER — ACETAMINOPHEN 500 MG
1000 TABLET ORAL EVERY 6 HOURS
Qty: 40 TABLET | Refills: 0 | Status: DISCONTINUED | OUTPATIENT
Start: 2024-04-15 | End: 2024-04-16 | Stop reason: HOSPADM

## 2024-04-15 RX ORDER — MAGNESIUM HYDROXIDE 1200 MG/15ML
LIQUID ORAL
Status: COMPLETED | OUTPATIENT
Start: 2024-04-15 | End: 2024-04-15

## 2024-04-15 RX ORDER — SODIUM CHLORIDE, SODIUM LACTATE, POTASSIUM CHLORIDE, CALCIUM CHLORIDE 600; 310; 30; 20 MG/100ML; MG/100ML; MG/100ML; MG/100ML
INJECTION, SOLUTION INTRAVENOUS CONTINUOUS
Status: ACTIVE | OUTPATIENT
Start: 2024-04-15 | End: 2024-04-15

## 2024-04-15 RX ORDER — DIPHENHYDRAMINE HYDROCHLORIDE 50 MG/ML
25 INJECTION INTRAMUSCULAR; INTRAVENOUS EVERY 6 HOURS PRN
Status: DISCONTINUED | OUTPATIENT
Start: 2024-04-15 | End: 2024-04-16 | Stop reason: HOSPADM

## 2024-04-15 RX ORDER — LABETALOL HYDROCHLORIDE 5 MG/ML
5 INJECTION, SOLUTION INTRAVENOUS
Status: DISCONTINUED | OUTPATIENT
Start: 2024-04-15 | End: 2024-04-15

## 2024-04-15 RX ORDER — DEXAMETHASONE SODIUM PHOSPHATE 4 MG/ML
INJECTION, SOLUTION INTRA-ARTICULAR; INTRALESIONAL; INTRAMUSCULAR; INTRAVENOUS; SOFT TISSUE PRN
Status: DISCONTINUED | OUTPATIENT
Start: 2024-04-15 | End: 2024-04-15 | Stop reason: SURG

## 2024-04-15 RX ORDER — AMOXICILLIN 250 MG
1 CAPSULE ORAL NIGHTLY
Status: DISCONTINUED | OUTPATIENT
Start: 2024-04-15 | End: 2024-04-16 | Stop reason: HOSPADM

## 2024-04-15 RX ORDER — ALPRAZOLAM 0.25 MG/1
0.25 TABLET ORAL 2 TIMES DAILY PRN
Status: DISCONTINUED | OUTPATIENT
Start: 2024-04-15 | End: 2024-04-16 | Stop reason: HOSPADM

## 2024-04-15 RX ORDER — BACLOFEN 10 MG/1
5 TABLET ORAL 3 TIMES DAILY PRN
Status: DISCONTINUED | OUTPATIENT
Start: 2024-04-15 | End: 2024-04-16 | Stop reason: HOSPADM

## 2024-04-15 RX ORDER — OXYCODONE HCL 10 MG/1
10 TABLET, FILM COATED, EXTENDED RELEASE ORAL ONCE
Status: COMPLETED | OUTPATIENT
Start: 2024-04-15 | End: 2024-04-15

## 2024-04-15 RX ORDER — ONDANSETRON 4 MG/1
4 TABLET, ORALLY DISINTEGRATING ORAL EVERY 4 HOURS PRN
Status: DISCONTINUED | OUTPATIENT
Start: 2024-04-15 | End: 2024-04-16 | Stop reason: HOSPADM

## 2024-04-15 RX ORDER — ONDANSETRON 2 MG/ML
INJECTION INTRAMUSCULAR; INTRAVENOUS PRN
Status: DISCONTINUED | OUTPATIENT
Start: 2024-04-15 | End: 2024-04-15 | Stop reason: SURG

## 2024-04-15 RX ORDER — ACETAMINOPHEN 500 MG
1000 TABLET ORAL EVERY 6 HOURS PRN
Status: DISCONTINUED | OUTPATIENT
Start: 2024-04-20 | End: 2024-04-16 | Stop reason: HOSPADM

## 2024-04-15 RX ORDER — OXYCODONE HYDROCHLORIDE 5 MG/1
5 TABLET ORAL
Status: DISCONTINUED | OUTPATIENT
Start: 2024-04-15 | End: 2024-04-16 | Stop reason: HOSPADM

## 2024-04-15 RX ORDER — SODIUM CHLORIDE, SODIUM LACTATE, POTASSIUM CHLORIDE, AND CALCIUM CHLORIDE .6; .31; .03; .02 G/100ML; G/100ML; G/100ML; G/100ML
IRRIGANT IRRIGATION
Status: DISCONTINUED | OUTPATIENT
Start: 2024-04-15 | End: 2024-04-15 | Stop reason: HOSPADM

## 2024-04-15 RX ORDER — SODIUM CHLORIDE AND POTASSIUM CHLORIDE 150; 900 MG/100ML; MG/100ML
INJECTION, SOLUTION INTRAVENOUS CONTINUOUS
Status: DISCONTINUED | OUTPATIENT
Start: 2024-04-15 | End: 2024-04-16 | Stop reason: HOSPADM

## 2024-04-15 RX ORDER — DOCUSATE SODIUM 100 MG/1
100 CAPSULE, LIQUID FILLED ORAL 2 TIMES DAILY
Status: DISCONTINUED | OUTPATIENT
Start: 2024-04-15 | End: 2024-04-16 | Stop reason: HOSPADM

## 2024-04-15 RX ORDER — DEXAMETHASONE SODIUM PHOSPHATE 4 MG/ML
4 INJECTION, SOLUTION INTRA-ARTICULAR; INTRALESIONAL; INTRAMUSCULAR; INTRAVENOUS; SOFT TISSUE EVERY 12 HOURS
Status: DISCONTINUED | OUTPATIENT
Start: 2024-04-16 | End: 2024-04-16 | Stop reason: HOSPADM

## 2024-04-15 RX ORDER — BISACODYL 10 MG
10 SUPPOSITORY, RECTAL RECTAL
Status: DISCONTINUED | OUTPATIENT
Start: 2024-04-15 | End: 2024-04-16 | Stop reason: HOSPADM

## 2024-04-15 RX ORDER — PREGABALIN 50 MG/1
50 CAPSULE ORAL 2 TIMES DAILY
COMMUNITY
Start: 2024-03-23

## 2024-04-15 RX ORDER — EZETIMIBE 10 MG/1
10 TABLET ORAL EVERY EVENING
Status: DISCONTINUED | OUTPATIENT
Start: 2024-04-15 | End: 2024-04-16 | Stop reason: HOSPADM

## 2024-04-15 RX ORDER — AMOXICILLIN 250 MG
1 CAPSULE ORAL
Status: DISCONTINUED | OUTPATIENT
Start: 2024-04-15 | End: 2024-04-16 | Stop reason: HOSPADM

## 2024-04-15 RX ORDER — PREGABALIN 25 MG/1
50 CAPSULE ORAL 2 TIMES DAILY
Status: DISCONTINUED | OUTPATIENT
Start: 2024-04-15 | End: 2024-04-16 | Stop reason: HOSPADM

## 2024-04-15 RX ORDER — MEPERIDINE HYDROCHLORIDE 25 MG/ML
6.25 INJECTION INTRAMUSCULAR; INTRAVENOUS; SUBCUTANEOUS
Status: DISCONTINUED | OUTPATIENT
Start: 2024-04-15 | End: 2024-04-15

## 2024-04-15 RX ORDER — OXYCODONE HCL 5 MG/5 ML
10 SOLUTION, ORAL ORAL
Status: COMPLETED | OUTPATIENT
Start: 2024-04-15 | End: 2024-04-15

## 2024-04-15 RX ORDER — HYDRALAZINE HYDROCHLORIDE 20 MG/ML
10 INJECTION INTRAMUSCULAR; INTRAVENOUS
Status: DISCONTINUED | OUTPATIENT
Start: 2024-04-15 | End: 2024-04-16 | Stop reason: HOSPADM

## 2024-04-15 RX ORDER — DIPHENHYDRAMINE HCL 25 MG
25 TABLET ORAL EVERY 6 HOURS PRN
Status: DISCONTINUED | OUTPATIENT
Start: 2024-04-15 | End: 2024-04-16 | Stop reason: HOSPADM

## 2024-04-15 RX ORDER — ROCURONIUM BROMIDE 10 MG/ML
INJECTION, SOLUTION INTRAVENOUS PRN
Status: DISCONTINUED | OUTPATIENT
Start: 2024-04-15 | End: 2024-04-15 | Stop reason: HOSPADM

## 2024-04-15 RX ORDER — ENEMA 19; 7 G/133ML; G/133ML
1 ENEMA RECTAL
Status: DISCONTINUED | OUTPATIENT
Start: 2024-04-15 | End: 2024-04-16 | Stop reason: HOSPADM

## 2024-04-15 RX ORDER — HYDROMORPHONE HYDROCHLORIDE 1 MG/ML
0.2 INJECTION, SOLUTION INTRAMUSCULAR; INTRAVENOUS; SUBCUTANEOUS
Status: DISCONTINUED | OUTPATIENT
Start: 2024-04-15 | End: 2024-04-15

## 2024-04-15 RX ORDER — VANCOMYCIN HYDROCHLORIDE 1 G/20ML
INJECTION, POWDER, LYOPHILIZED, FOR SOLUTION INTRAVENOUS
Status: COMPLETED | OUTPATIENT
Start: 2024-04-15 | End: 2024-04-15

## 2024-04-15 RX ORDER — HYDROMORPHONE HYDROCHLORIDE 1 MG/ML
0.5 INJECTION, SOLUTION INTRAMUSCULAR; INTRAVENOUS; SUBCUTANEOUS
Status: DISCONTINUED | OUTPATIENT
Start: 2024-04-15 | End: 2024-04-16 | Stop reason: HOSPADM

## 2024-04-15 RX ORDER — METOPROLOL TARTRATE 1 MG/ML
1 INJECTION, SOLUTION INTRAVENOUS
Status: DISCONTINUED | OUTPATIENT
Start: 2024-04-15 | End: 2024-04-15

## 2024-04-15 RX ORDER — EPHEDRINE SULFATE 50 MG/ML
5 INJECTION, SOLUTION INTRAVENOUS
Status: DISCONTINUED | OUTPATIENT
Start: 2024-04-15 | End: 2024-04-15

## 2024-04-15 RX ORDER — ATORVASTATIN CALCIUM 40 MG/1
40 TABLET, FILM COATED ORAL EVERY EVENING
Status: DISCONTINUED | OUTPATIENT
Start: 2024-04-15 | End: 2024-04-16 | Stop reason: HOSPADM

## 2024-04-15 RX ORDER — ONDANSETRON 2 MG/ML
4 INJECTION INTRAMUSCULAR; INTRAVENOUS EVERY 4 HOURS PRN
Status: DISCONTINUED | OUTPATIENT
Start: 2024-04-15 | End: 2024-04-16 | Stop reason: HOSPADM

## 2024-04-15 RX ORDER — ACETAMINOPHEN 500 MG
1000 TABLET ORAL ONCE
Status: COMPLETED | OUTPATIENT
Start: 2024-04-15 | End: 2024-04-15

## 2024-04-15 RX ORDER — EPHEDRINE SULFATE 50 MG/ML
INJECTION, SOLUTION INTRAVENOUS PRN
Status: DISCONTINUED | OUTPATIENT
Start: 2024-04-15 | End: 2024-04-15 | Stop reason: HOSPADM

## 2024-04-15 RX ORDER — CEFAZOLIN SODIUM 1 G/3ML
2 INJECTION, POWDER, FOR SOLUTION INTRAMUSCULAR; INTRAVENOUS ONCE
Status: COMPLETED | OUTPATIENT
Start: 2024-04-15 | End: 2024-04-15

## 2024-04-15 RX ORDER — HALOPERIDOL 5 MG/ML
1 INJECTION INTRAMUSCULAR
Status: DISCONTINUED | OUTPATIENT
Start: 2024-04-15 | End: 2024-04-15

## 2024-04-15 RX ORDER — POLYETHYLENE GLYCOL 3350 17 G/17G
1 POWDER, FOR SOLUTION ORAL 2 TIMES DAILY PRN
Status: DISCONTINUED | OUTPATIENT
Start: 2024-04-15 | End: 2024-04-16 | Stop reason: HOSPADM

## 2024-04-15 RX ORDER — ONDANSETRON 2 MG/ML
4 INJECTION INTRAMUSCULAR; INTRAVENOUS
Status: DISCONTINUED | OUTPATIENT
Start: 2024-04-15 | End: 2024-04-15

## 2024-04-15 RX ORDER — HYDROMORPHONE HYDROCHLORIDE 1 MG/ML
0.4 INJECTION, SOLUTION INTRAMUSCULAR; INTRAVENOUS; SUBCUTANEOUS
Status: DISCONTINUED | OUTPATIENT
Start: 2024-04-15 | End: 2024-04-15

## 2024-04-15 RX ORDER — HYDROMORPHONE HYDROCHLORIDE 1 MG/ML
0.1 INJECTION, SOLUTION INTRAMUSCULAR; INTRAVENOUS; SUBCUTANEOUS
Status: DISCONTINUED | OUTPATIENT
Start: 2024-04-15 | End: 2024-04-15

## 2024-04-15 RX ORDER — TRANEXAMIC ACID 100 MG/ML
INJECTION, SOLUTION INTRAVENOUS PRN
Status: DISCONTINUED | OUTPATIENT
Start: 2024-04-15 | End: 2024-04-15 | Stop reason: SURG

## 2024-04-15 RX ORDER — HYDRALAZINE HYDROCHLORIDE 20 MG/ML
5 INJECTION INTRAMUSCULAR; INTRAVENOUS
Status: DISCONTINUED | OUTPATIENT
Start: 2024-04-15 | End: 2024-04-15

## 2024-04-15 RX ORDER — CEFAZOLIN SODIUM 1 G/3ML
INJECTION, POWDER, FOR SOLUTION INTRAMUSCULAR; INTRAVENOUS
Status: DISCONTINUED | OUTPATIENT
Start: 2024-04-15 | End: 2024-04-15 | Stop reason: HOSPADM

## 2024-04-15 RX ORDER — SODIUM CHLORIDE, SODIUM LACTATE, POTASSIUM CHLORIDE, CALCIUM CHLORIDE 600; 310; 30; 20 MG/100ML; MG/100ML; MG/100ML; MG/100ML
INJECTION, SOLUTION INTRAVENOUS CONTINUOUS
Status: DISCONTINUED | OUTPATIENT
Start: 2024-04-15 | End: 2024-04-15

## 2024-04-15 RX ORDER — OXYCODONE HYDROCHLORIDE 10 MG/1
10 TABLET ORAL
Status: DISCONTINUED | OUTPATIENT
Start: 2024-04-15 | End: 2024-04-16 | Stop reason: HOSPADM

## 2024-04-15 RX ORDER — DIPHENHYDRAMINE HYDROCHLORIDE 50 MG/ML
12.5 INJECTION INTRAMUSCULAR; INTRAVENOUS
Status: DISCONTINUED | OUTPATIENT
Start: 2024-04-15 | End: 2024-04-15

## 2024-04-15 RX ORDER — SUCCINYLCHOLINE CHLORIDE 20 MG/ML
INJECTION INTRAMUSCULAR; INTRAVENOUS PRN
Status: DISCONTINUED | OUTPATIENT
Start: 2024-04-15 | End: 2024-04-15 | Stop reason: SURG

## 2024-04-15 RX ORDER — LIDOCAINE HYDROCHLORIDE 20 MG/ML
INJECTION, SOLUTION EPIDURAL; INFILTRATION; INTRACAUDAL; PERINEURAL PRN
Status: DISCONTINUED | OUTPATIENT
Start: 2024-04-15 | End: 2024-04-15 | Stop reason: SURG

## 2024-04-15 RX ADMIN — SUCCINYLCHOLINE CHLORIDE 120 MG: 20 INJECTION, SOLUTION INTRAMUSCULAR; INTRAVENOUS at 07:02

## 2024-04-15 RX ADMIN — LIDOCAINE HYDROCHLORIDE 80 MG: 20 INJECTION, SOLUTION EPIDURAL; INFILTRATION; INTRACAUDAL at 07:02

## 2024-04-15 RX ADMIN — TRANEXAMIC ACID 1000 MG: 100 INJECTION, SOLUTION INTRAVENOUS at 07:12

## 2024-04-15 RX ADMIN — ONDANSETRON 4 MG: 2 INJECTION INTRAMUSCULAR; INTRAVENOUS at 09:36

## 2024-04-15 RX ADMIN — FENTANYL CITRATE 50 MCG: 50 INJECTION, SOLUTION INTRAMUSCULAR; INTRAVENOUS at 08:22

## 2024-04-15 RX ADMIN — PREGABALIN 50 MG: 25 CAPSULE ORAL at 17:11

## 2024-04-15 RX ADMIN — SODIUM CHLORIDE, POTASSIUM CHLORIDE, SODIUM LACTATE AND CALCIUM CHLORIDE: 600; 310; 30; 20 INJECTION, SOLUTION INTRAVENOUS at 06:10

## 2024-04-15 RX ADMIN — FENTANYL CITRATE 50 MCG: 50 INJECTION, SOLUTION INTRAMUSCULAR; INTRAVENOUS at 13:21

## 2024-04-15 RX ADMIN — CEFAZOLIN 2 G: 2 INJECTION, POWDER, FOR SOLUTION INTRAMUSCULAR; INTRAVENOUS at 14:58

## 2024-04-15 RX ADMIN — OXYCODONE HYDROCHLORIDE 10 MG: 5 SOLUTION ORAL at 13:21

## 2024-04-15 RX ADMIN — ATORVASTATIN CALCIUM 40 MG: 40 TABLET, FILM COATED ORAL at 17:11

## 2024-04-15 RX ADMIN — ACETAMINOPHEN 1000 MG: 500 TABLET, FILM COATED ORAL at 06:11

## 2024-04-15 RX ADMIN — EZETIMIBE 10 MG: 10 TABLET ORAL at 17:11

## 2024-04-15 RX ADMIN — OXYCODONE HYDROCHLORIDE 10 MG: 10 TABLET, FILM COATED, EXTENDED RELEASE ORAL at 06:11

## 2024-04-15 RX ADMIN — ACETAMINOPHEN 1000 MG: 500 TABLET, FILM COATED ORAL at 14:50

## 2024-04-15 RX ADMIN — ROCURONIUM BROMIDE 10 MG: 50 INJECTION, SOLUTION INTRAVENOUS at 07:02

## 2024-04-15 RX ADMIN — EPHEDRINE SULFATE 5 MG: 50 INJECTION, SOLUTION INTRAVENOUS at 07:25

## 2024-04-15 RX ADMIN — FENTANYL CITRATE 25 MCG: 50 INJECTION, SOLUTION INTRAMUSCULAR; INTRAVENOUS at 07:28

## 2024-04-15 RX ADMIN — CEFAZOLIN 2 G: 2 INJECTION, POWDER, FOR SOLUTION INTRAMUSCULAR; INTRAVENOUS at 21:52

## 2024-04-15 RX ADMIN — PROPOFOL 100 MCG/KG/MIN: 10 INJECTION, EMULSION INTRAVENOUS at 07:17

## 2024-04-15 RX ADMIN — CEFAZOLIN 2 G: 1 INJECTION, POWDER, FOR SOLUTION INTRAMUSCULAR; INTRAVENOUS at 07:09

## 2024-04-15 RX ADMIN — FENTANYL CITRATE 50 MCG: 50 INJECTION, SOLUTION INTRAMUSCULAR; INTRAVENOUS at 07:15

## 2024-04-15 RX ADMIN — EPHEDRINE SULFATE 5 MG: 50 INJECTION, SOLUTION INTRAVENOUS at 07:45

## 2024-04-15 RX ADMIN — POTASSIUM CHLORIDE AND SODIUM CHLORIDE: 900; 150 INJECTION, SOLUTION INTRAVENOUS at 14:55

## 2024-04-15 RX ADMIN — DOCUSATE SODIUM 100 MG: 100 CAPSULE, LIQUID FILLED ORAL at 17:11

## 2024-04-15 RX ADMIN — DEXAMETHASONE SODIUM PHOSPHATE 4 MG: 4 INJECTION, SOLUTION INTRAMUSCULAR; INTRAVENOUS at 13:24

## 2024-04-15 RX ADMIN — FENTANYL CITRATE 25 MCG: 50 INJECTION, SOLUTION INTRAMUSCULAR; INTRAVENOUS at 07:02

## 2024-04-15 RX ADMIN — PROPOFOL 150 MG: 10 INJECTION, EMULSION INTRAVENOUS at 07:02

## 2024-04-15 RX ADMIN — DEXAMETHASONE SODIUM PHOSPHATE 4 MG: 4 INJECTION, SOLUTION INTRAMUSCULAR; INTRAVENOUS at 17:11

## 2024-04-15 RX ADMIN — DEXAMETHASONE SODIUM PHOSPHATE 8 MG: 4 INJECTION INTRA-ARTICULAR; INTRALESIONAL; INTRAMUSCULAR; INTRAVENOUS; SOFT TISSUE at 07:09

## 2024-04-15 RX ADMIN — PROPOFOL 60 MG: 10 INJECTION, EMULSION INTRAVENOUS at 09:51

## 2024-04-15 RX ADMIN — ACETAMINOPHEN 1000 MG: 500 TABLET, FILM COATED ORAL at 21:53

## 2024-04-15 ASSESSMENT — PAIN DESCRIPTION - PAIN TYPE
TYPE: SURGICAL PAIN

## 2024-04-15 ASSESSMENT — FIBROSIS 4 INDEX: FIB4 SCORE: 1.35

## 2024-04-15 NOTE — PROGRESS NOTES
Postop check.  No leg pain.  Full strength in the legs.  Back is flat.  He will continue bed rest of the night.  No headup.

## 2024-04-15 NOTE — ANESTHESIA PROCEDURE NOTES
Airway    Date/Time: 4/15/2024 7:05 AM    Performed by: Antonio Hurtado D.O.  Authorized by: Antonio Hurtado D.O.    Location:  OR  Urgency:  Elective  Indications for Airway Management:  Anesthesia      Spontaneous Ventilation: absent    Sedation Level:  Deep  Preoxygenated: Yes    Patient Position:  Sniffing  Final Airway Type:  Endotracheal airway  Final Endotracheal Airway:  ETT  Cuffed: Yes    Technique Used for Successful ETT Placement:  Direct laryngoscopy    Insertion Site:  Oral  Blade Type:  Berna  Laryngoscope Blade/Videolaryngoscope Blade Size:  3  ETT Size (mm):  7.0  Measured from:  Teeth  ETT to Teeth (cm):  21  Placement Verified by: auscultation and capnometry    Cormack-Lehane Classification:  Grade I - full view of glottis  Number of Attempts at Approach:  1  Ventilation Between Attempts:  None  Number of Other Approaches Attempted:  0

## 2024-04-15 NOTE — PROGRESS NOTES
UPDATE TO HISTORY AND PHYSICAL    I met with patient and any family members in preop. Again discussed planned surgery. I went over the risks, benefits and alternatives of the surgery in the office visit. I had discussed any off label use of products. The patient had been given literature to read about the procedure in person and via email if able and had access to the office note via the patient portal. I Answered any questions remaining from prior visits.     There was no change to my last H and P (it may be labelled a progress note or a H and P in EPIC).   The note was made by either myself, or my PAs Sony Beaver or Matthew Alva but is signed by me, if it was done by my physician assistant.   I verify it is correct and has my co-signature.    Marcia Garrido MD PhD MARGIE

## 2024-04-15 NOTE — LETTER
March 4, 2024    Patient Name: Miguel Carranza  Surgeon Name: Marcia Garrido M.D.  Surgery Facility: [facility] ([facility])  Surgery Date: 4/15/2024    The time of your surgery is not final and may change up to and until the day of your surgery. You will be contacted 1-2 business days prior to your surgery date with your check-in and surgery time.    BEFORE YOUR SURGERY - WITH THE FACILITY  Pre Registration and/or Lab Work/Tests must be done within 60 days of your surgery date. These will be done at [facility], with an appointment. This appointment should be completed before your pre op appointment in our office.    On 4/8 - if you have not already heard from [facility] Pre Admission/Registration Department, please call them at [phone] option 2, then option 1, for an appointment.      BEFORE YOUR SURGERY - WITH YOUR SURGEONS OFFICE  Pre op Appointment:   Date: 04/04/24   Time: 11:00AM   Provider: Sony Beaver PA-C    Location: [address] [place] [address]    Bring a list of all medications you are currently taking including the dosing and frequency.    Please read the MEDICATION INSTRUCTIONS below completely.    DAY OF YOUR SURGERY  Nothing to eat or drink and refrain from smoking any substance after midnight the day of your surgery. Smoking may interfere with the anesthetic and frequently produces nausea during the recovery period.    Continue taking all lifesaving medications, including the morning of your surgery with small sip of water.    You will need a responsible adult to drive you to and from your surgery.    AFTER YOUR SURGERY  2 Week Post op Appointment:   Date: 05/03/24   Time: 11:00AM  With: Sony Baever PA-C   Location: [address] [place] [address] Ave    6 Week Post op Appointment:   Date: 05/23/24   Time: 10:30AM   With: Sony Beaver PA-C   Location: [address] [place], [place] [address]    MEDICATION INSTRUCTIONS  Do not take these medications prior to your procedure:             Anti-inflammatories: stop 7 days prior, restart when advised. For fusions avoid for 12 weeks after surgery            Naproxen (Naprosyn or Alleve)            Motrin            Ibuprofen            Nabumetone (Relafen)            Meloxicam (Mobic)            Celebrex            Salsalate            Diclofenac (Arthrotec, Voltaren, Flector)            Sulindac (Clinoril            Etodolac (Lodine)            Indomethacin (Indocin)            Ketoprofen            Ketorolac            Oxaprozin (Daypro)            Piroxicam (Feldene)            Blood thinners (stop after approval from the prescribing physician)            Aspirin (any dosage): Stop 14 days prior, restart 7 days after procedure            Any medications that contain aspirin in combination (ie: Excedrin migraine, Fiorinal, and Norgesic)            Warfarin (Coumadin): Stop 14 days prior, INR day of procedure, restart 2-3 weeks after procedure            Antiplatelet: Stop 14 days prior, restart 7 days after procedure            Ticlid (ticlopidine): Stop 14 days prior            Plavix (clopidogrel): Stop 7 days prior            Aggrenox or Dipyridamole: Stop 14 days prior            ReoPro (abciximab): Stop 14 days prior            Integrilin (eptifibatide): Stop 14 days prior            Aggrastat (tirofiban): Stop 14 days prior            Lovenox (Enoxaparin): Stop 24hrs before and restart 24hrs after procedure            Heparin: Stop 24hrs before             Dalteparin (Fragmin): Stop 24hrs before            Fondaparinux (Arixtra): Stop 24hrs before            Xeralto, Dabigatran (Pradaxa) Stop 5 days prior            Eliquis (apibaxan) Stop 7 days prior    Okay to take these medications as prescribed:            Muscle relaxers            Acetaminophen and pain medications that have it in addition to oxycodone and hydrocodone            Blood pressure, cholesterol and diabetes medications are ok      TIME OFF WORK  FMLA or Disability forms can be  faxed directly to [phone] or you may drop them off at any [facility]. Our office charges a $35.00 fee. Forms will be completed within 5-10 business days after payment is received. For the status of your forms you may contact our disability office directly at [phone].    DENTAL PROCDURES/CLEANINGS Avoid 3 weeks before surgery and for 3 months after surgery.    QUESTIONS ABOUT COSTS  Contact our Patient Financial Services department at [phone] for more information.    If you have any questions, please contact our office.    Tara   Surgery Scheduler  [email or web address]  ? [phone]   Fax: [phone]  EXT [address]  [place] [address]  [phone]

## 2024-04-15 NOTE — ANESTHESIA PREPROCEDURE EVALUATION
Case: [id number] Date/Time: 04/15/24 0645    Procedure: L1-2-3 decompressive laminectomy and resection of intradural tumor - Time: 3 hrs . First case in March... think we can bounce a fusion to anotther date 3/4/24? He has a tumor.    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    Surgeons: ANUJ Mace H&P:  PAST MEDICAL HISTORY:   74 y.o. male who presents for Procedure(s):  L1-2-3 decompressive laminectomy and resection of intradural tumor.  He has current and past medical problems significant for:    Past Medical History:   Diagnosis Date    Acute pain of left knee 2016    Arthritis     Cancer (HCC) 2015    Prostate    Cataract     surgery eyes    Cold 2015    chest congestion, afebrile,sore throat    Dental disorder     Partial upper-don't use    Glaucoma 2024    bilat eye-treated    High cholesterol     Other specified disorder of intestines     Diarrhea/not currently    Pain 2024    lower back down left leg    Psychiatric problem     depression    Snoring     No sleep study       SMOKING/ALCOHOL/RECREATIONAL DRUG USE:  Social History     Tobacco Use    Smoking status: Former     Current packs/day: 0.00     Average packs/day: 0.3 packs/day for 48.1 years (16.8 ttl pk-yrs)     Types: Cigarettes     Start date:      Quit date: 2024     Years since quittin.1    Smokeless tobacco: Never    Tobacco comments:     a pack a week    Vaping Use    Vaping Use: Never used   Substance Use Topics    Alcohol use: Not Currently     Alcohol/week: 4.2 oz     Types: 7 Cans of beer per week     Comment:  drank daily now occ    Drug use: No     Social History     Substance and Sexual Activity   Drug Use No       PAST SURGICAL HISTORY:  Past Surgical History:   Procedure Laterality Date    INGUINAL HERNIA REPAIR  2018    rt side     CATARACT EXTRACTION WITH IOL Bilateral 2017    PROSTATECTOMY ROBOTIC XI  01/20/2015    Performed by Brandyn Brooke M.D. at SURGERY [facility] ORS    VASECTOMY  1990s       ALLERGIES:   Allergies   Allergen Reactions    Bactrim Ds      Rash       MEDICATIONS:  No current facility-administered medications on file prior to encounter.     Current Outpatient Medications on File Prior to Encounter   Medication Sig Dispense Refill    atorvastatin (LIPITOR) 40 MG Tab Take 1 Tablet by mouth every evening. 90 Tablet 3    sertraline (ZOLOFT) 50 MG Tab Take 1 Tablet by mouth every day. 90 Tablet 3    meloxicam (MOBIC) 15 MG tablet Take 1 Tablet by mouth every day. (Patient not taking: Reported on 3/5/2024) 30 Tablet 0    methylPREDNISolone (MEDROL DOSEPAK) 4 MG Tablet Therapy Pack Follow schedule on package instructions. (Patient not taking: Reported on 3/5/2024) 21 Tablet 0    ezetimibe (ZETIA) 10 MG Tab Take 1 Tablet by mouth every day. (Patient taking differently: Take 10 mg by mouth every evening.) 90 Tablet 2    Papav-Phentolamine-Alprostadil 30-1-0.02 MG/ML Solution  (Patient not taking: Reported on 3/5/2024)      Psyllium (METAMUCIL FIBER PO) Take  by mouth every day.      Cholecalciferol (VITAMIN D) 2000 UNIT TABS Take 1 Tablet by mouth every evening.      therapeutic multivitamin-minerals (THERAGRAN-M) TABS Take 1 Tablet by mouth every day.         LABS:  Lab Results   Component Value Date/Time    HEMOGLOBIN 14.7 03/19/2024 1007    HEMATOCRIT 42.5 03/19/2024 1007    WBC 8.9 03/19/2024 1007     Lab Results   Component Value Date/Time    SODIUM 140 03/19/2024 1007    POTASSIUM 5.1 03/19/2024 1007    CHLORIDE 105 03/19/2024 1007    CO2 24 03/19/2024 1007    GLUCOSE 101 (H) 03/19/2024 1007    BUN 17 03/19/2024 1007    CALCIUM 9.5 03/19/2024 1007         PREVIOUS ANESTHETICS: See EMR  __________________________________________    Relevant Problems   CARDIAC   (positive) Calcification of aorta (HCC)         (positive)  Renal insufficiency      Other   (positive) Smoker       Physical Exam    Airway   Mallampati: II  TM distance: >3 FB  Neck ROM: full       Cardiovascular - normal exam  Rhythm: regular  Rate: normal  (-) murmur     Dental - normal exam           Pulmonary - normal exam  Breath sounds clear to auscultation     Abdominal    Neurological - normal exam                   Anesthesia Plan    ASA 2       Plan - general       Airway plan will be ETT          Induction: intravenous    Postoperative Plan: Postoperative administration of opioids is intended.    Pertinent diagnostic labs and testing reviewed    Informed Consent:    Anesthetic plan and risks discussed with patient.    Use of blood products discussed with: patient whom consented to blood products.

## 2024-04-15 NOTE — OP REPORT
1. DATE OF SURGERY: 4/15/2024    2. SURGEON:  Marcia Garrido MD, PhD, MARGIE, Neurosurgeon    3. ASSISTANT:  Sony Beaver PA-C  An assistant was crucial to have during the case as the assistant helped with positioning, keeping the field clear of blood and retraction. This case could not be done easily without a qualified assistant. It was medically necessary    4. TYPE OF ANESTHESIA:  General anesthesia with endotracheal intubation    5. PREOPERATIVE DIAGNOSIS:      .  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 the schwannoma, neurofibroma or ependymoma     6. POSTOPERATIVE DIAGNOSIS:      .  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 the schwannoma, neurofibroma or ependymoma       7. HISTORY: See formal admission H and P    2/22/2024  This is a very nice 74-year-old man.  He basically just presents with a 2-week history of severe pain on the left leg in the L5 distribution.  The pain was severe.  He went to the urgent care.  He had a steroid pack which has helped somewhat.  He still continues with pain.  It can be worse first thing in the morning.  Very L5 distribution.  The right leg is okay.  There is no numbness or tingling.  When he sitting is pretty good.     He had prostate cancer in 2015.  He is retired.  His wife was in attendance.  He is pretty healthy otherwise.  He smokes a little bit but is trying to giveup.  Bladder and bowel functions okay.     He also reports that he has had pain in the left shoulder down the left arm for the last year or so.  This is intermittent.    8. PREOPERATIVE PHYSICAL EXAMINATION:     2/22/2024  He was neurologically intact today.     Last Imaging Result(s):      No results found.     MRI scan of the lumbar spine performed without contrast every 16 2022 shows an approximate 2 cm intradural extramedullary mass which fills the canal in the lower half of L2 at the L2-3 disc space.  There is moderate L4-5 spinal stenosis.      9. TITLE OF PROCEDURE:  L1-2-3  decompressive laminectomy  and resection of intradural extra medullary tumor causing severe spinal cord compression   Microscopic magnification for microdissection  i/o On-Q Pain catheters in paraspinal musculature    10. OPERATIVE FINDINGS: There was an intradural tumor at the L2-3 level.  It filled the entire canal.  It appeared to be arising off one of the nerve roots.  I was able to resect it and still leave the nerve root intact.  Collagen was sent for pathology.    11. OPERATED LEVELS: L1-2, L2-3    12. IMPLANTS USED: Nil    13. COMPLICATIONS:  Nil.    14. ESTIMATED BLOOD LOSS:  50   mL      15. OPERATIVE DETAILS:  After fully informed consent, the patient was brought to the operating room.  General anesthesia was administered.  The patient was given intravenous antibiotic.  The patient was then turned prone onto the OSI operating table  and OSI frame.  All pressure points were padded.  Initial fluoroscopic localization was taken for skin marking.  The posterior lumbar region was prepped and draped in a standard fashion.      Using the initial x-ray as a guide, a midline skin incision was then affected.  This involved the spinous processes at the affected levels where the laminectomies were to be done.  This was at the L1-2 and L2-3 A bilateral subperiosteal exposure was affected.  Great care was taken not to violate the facet joints.  I completely removed L2-3 and took about half of L1 and two thirds of L3.  I left 5 to 6 mm of pars thecal sac.  On the side.    Once the exposure had been done, self-retaining retractors were placed.  Hemostasis was obtained.   Laminectomy was affected. The laminectomy was affected with Leksell rongeurs initially, then I used an AM-8 drill bit under microscopic magnification and illumination to thin down the lamina.      Once the lamina was paper thin, I used combination of 2 mm Kerrison punches to remove the remaining bone and ligamentum flavum.  I worked from a cranial to a  caudal direction.     Intraoperative ultrasound was then brought in.  I could see as above and below the lesion I could see the lesion compressing the cord.      The microscope was brought in the field of view.  I opened the dura in a midline fashion.  I managed to get the arachnoid intact.  I used 4-0 Nurolon sutures to tack up the dura.    Under the microscope using microsurgical technique I then entered the arachnoid above the lesion and continued dissection in a caudal fashion.  I resected the tumor in a piecemeal fashion.  Microscopic resection was affected.  I used the ultrasonic aspirator to debulk the tumor.  I then used microdissection to peel the tumor back on itself and removed in a piecemeal fashion.  It was evident that the tumor was arising from a single nerve.  I managed to trim the tumor off the nerve and keep intact fascicles.  Neuromonitoring was quiet throughout.  All the tumor removed was  sent to pathology.  A gross macroscopic resection was affected.  There was free flow of CSF from both directions.  There was no bleeding.        The dura was reapproximated during using 6-0 Alexandria-Eleazar.  I placed dural adhesive over this. 2 paraspinal On-Q pain catheters were placed.     I then closed the wall wound over a suction subfascial Hemovac connected to Kirit-Diallo bulb using multiple Vicryl sutures.  This was done in layers.  The skin was closed with 2-O vertical mattress nylon.    Neuro monitoring was stable and improved by the end of the case.  It was quiet during tumor resection.     All counts were correct at the end of the case and all instrumentation was accounted for.  The patient was then carefully turned supine again onto a regular operating table without incident.    At the end of case, the patient was moving both lower extremities well.    16 PROGNOSIS:    Patient endoscopic of the resection of the tumor was obtained.  We will wait for  pathology.    Marcia Garrido MD, PhD, MARGIE,  Neurosurgeon        Marcia Garrido MD, PhD, MARGIE  Neurosurgeon  ? [phone]  Fax: [phone] [place] [address]  [email or web address]  [email or web address]          Cc: Bari Mclaughlin A.P.R.N

## 2024-04-15 NOTE — OR NURSING
0958 Pt arrived from OR via [facility]. Report given by anesthesia and RN. 99f. Sleeping, opa, 8L mask even non labored breathing. Lungs clear airway patent. SR. Skin pink warm dry. Piv patent. Back dressing cdi no drainage, no hematoma, x1 MARY to bulb suction, scant serosanguinous drainage. Sadler 0cc, strict bed rest flat 24 hrs.    1003 On-Q pump 4cc/hr verified.     1015 MD Garrido at bedside     1025 Sd YEPEZ at bedside    1026 updated Jaqueline, spouse, via text     1035 awake, gag reflex intact, opa removed. Spontaneous even non labored breathing.      1037 BLE wiggles toes, strong dorsi flex/ext, lifts BLE, follows commands.     1045 Sd YEPEZ at bedside     1048 updated Jaqueline, spouse, via text     1051 clear speech, follows commands. DENIES pain, nausea, n/t at this time.     1054 back dressing cdi no drainage, no hematoma.     1114 updated Jaqueline, via text     1123 awake alert, even non labored breathing. DENIES pain and nausea. Back dressing cdi. Cms and neuro intact.     1153 hand off to Ludivina RN    1228 updated Jaqueline, via text     1229 Sadler 275cc yellow clear    1234 tolerates sips of water    1247 awake alert, cms and neuro intact. Denies pain, nausea, sob, chest pain, n/t. Back dressing cdi no drainage, no hematoma.     1255 Jaqueline, spouse at bedside \\    1523 report given to Jessica AKINS. [id number]    1321, c/o pain, treated per mar    1330 sleeping, face relaxed, even non labored breathing. Skin pink warm dry.     1349 MD Garrido at bedside. Cms and neuro intact.     1418 sadler 400cc yellow clear    1526 hand off to Ludivina AKINS

## 2024-04-15 NOTE — CARE PLAN
The patient is Watcher - Medium risk of patient condition declining or worsening    Shift Goals  Clinical Goals: pain control  Patient Goals: rest    Progress made toward(s) clinical / shift goals:    Problem: Knowledge Deficit - Standard  Goal: Patient and family/care givers will demonstrate understanding of plan of care, disease process/condition, diagnostic tests and medications  Outcome: Progressing  POC discussed-pt verbalized understanding.     Patient is not progressing towards the following goals:

## 2024-04-15 NOTE — LETTER
February 27, 2024    Patient Name: Miguel Carranza  Surgeon Name: Marcia Garrido M.D.  Surgery Facility: [facility] ([address] [facility])  Surgery Date: 4/15/2024    The time of your surgery is not final and may change up to and until the day of your surgery. You will be contacted 1-2 business days prior to your surgery date with your check-in and surgery time.    BEFORE YOUR SURGERY - WITH THE FACILITY  Pre Registration and/or Lab Work/Tests must be done within 60 days of your surgery date. These will be done at [facility], with an appointment. This appointment should be completed before your pre op appointment in our office.    On 4/8 - if you have not already heard from [facility] Pre Admission/Registration Department, please call them at [phone] option 2, then option 1, for an appointment.      BEFORE YOUR SURGERY - WITH YOUR SURGEONS OFFICE  Pre op Appointment:   Date: 04/04/24   Time: 11:00AM   Provider: Matthew Alva PA-C    Location: [address] [place] [address]    Bring a list of all medications you are currently taking including the dosing and frequency.    Please read the MEDICATION INSTRUCTIONS below completely.    DAY OF YOUR SURGERY  Nothing to eat or drink and refrain from smoking any substance after midnight the day of your surgery. Smoking may interfere with the anesthetic and frequently produces nausea during the recovery period.    Continue taking all lifesaving medications, including the morning of your surgery with small sip of water.    You will need a responsible adult to drive you to and from your surgery.    AFTER YOUR SURGERY  2 Week Post op Appointment:   Date: 05/03/24   Time: 11:00AM  With: Matthew Alva PA-C   Location: [facility]    6 Week Post op Appointment:   Date: 05/24/24   Time: 11:00AM   With: Matthew Alva PA-C   Location: [facility]    MEDICATION INSTRUCTIONS  Do not take these medications prior to your procedure:             Anti-inflammatories: stop 7 days prior, restart when advised. For fusions avoid for 12 weeks after surgery            Naproxen (Naprosyn or Alleve)            Motrin            Ibuprofen            Nabumetone (Relafen)            Meloxicam (Mobic)            Celebrex            Salsalate            Diclofenac (Arthrotec, Voltaren, Flector)            Sulindac (Clinoril            Etodolac (Lodine)            Indomethacin (Indocin)            Ketoprofen            Ketorolac            Oxaprozin (Daypro)            Piroxicam (Feldene)            Blood thinners (stop after approval from the prescribing physician)            Aspirin (any dosage): Stop 14 days prior, restart 7 days after procedure            Any medications that contain aspirin in combination (ie: Excedrin migraine, Fiorinal, and Norgesic)            Warfarin (Coumadin): Stop 14 days prior, INR day of procedure, restart 2-3 weeks after procedure            Antiplatelet: Stop 14 days prior, restart 7 days after procedure            Ticlid (ticlopidine): Stop 14 days prior            Plavix (clopidogrel): Stop 7 days prior            Aggrenox or Dipyridamole: Stop 14 days prior            ReoPro (abciximab): Stop 14 days prior            Integrilin (eptifibatide): Stop 14 days prior            Aggrastat (tirofiban): Stop 14 days prior            Lovenox (Enoxaparin): Stop 24hrs before and restart 24hrs after procedure            Heparin: Stop 24hrs before             Dalteparin (Fragmin): Stop 24hrs before            Fondaparinux (Arixtra): Stop 24hrs before            Xeralto, Dabigatran (Pradaxa) Stop 5 days prior            Eliquis (apibaxan) Stop 7 days prior    Okay to take these medications as prescribed:            Muscle relaxers            Acetaminophen and pain medications that have it in addition to oxycodone and hydrocodone            Blood pressure, cholesterol and diabetes medications are ok      TIME OFF WORK  FMLA or Disability forms can be  faxed directly to [phone] or you may drop them off at any [facility]. Our office charges a $35.00 fee. Forms will be completed within 5-10 business days after payment is received. For the status of your forms you may contact our disability office directly at [phone].    DENTAL PROCDURES/CLEANINGS Avoid 3 weeks before surgery and for 3 months after surgery.    QUESTIONS ABOUT COSTS  Contact our Patient Financial Services department at [phone] for more information.    If you have any questions, please contact our office.    Tara   Surgery Scheduler  [email or web address]  ? [phone]   Fax: [phone]  EXT [address]  [place] [address]  [phone]

## 2024-04-15 NOTE — ANESTHESIA TIME REPORT
Anesthesia Start and Stop Event Times       Date Time Event    4/15/2024 0639 Ready for Procedure     0659 Anesthesia Start     1001 Anesthesia Stop          Responsible Staff  04/15/24      Name Role Begin End    Antonio Hurtado D.O. Anesth 0659 1001          Overtime Reason:  no overtime (within assigned shift)    Comments:

## 2024-04-15 NOTE — ANESTHESIA POSTPROCEDURE EVALUATION
Patient: Miguel Carranza    Procedure Summary       Date: 04/15/24 Room / Location: [place] [address] / SURGERY [facility]    Anesthesia Start: 0659 Anesthesia Stop: 1001    Procedure: L1-2-3 decompressive laminectomy and resection of intradural tumor (Spine Lumbar) Diagnosis:       Intradural extramedullary spinal tumor      (Intradural extramedullary spinal tumor [D49.7])    Surgeons: Marcia Garrido M.D. Responsible Provider: Antonio Hurtado D.O.    Anesthesia Type: general ASA Status: 2            Final Anesthesia Type: general  Last vitals  BP   Blood Pressure : 125/67    Temp   36.8 °C (98.3 °F)    Pulse   81   Resp   18    SpO2   95 %      Anesthesia Post Evaluation    Patient location during evaluation: PACU  Patient participation: complete - patient participated  Level of consciousness: awake and alert    Airway patency: patent  Anesthetic complications: no  Cardiovascular status: hemodynamically stable  Respiratory status: acceptable  Hydration status: euvolemic    PONV: none          No notable events documented.     Nurse Pain Score: 0 (NPRS)

## 2024-04-16 VITALS
HEIGHT: 64 IN | RESPIRATION RATE: 16 BRPM | HEART RATE: 85 BPM | TEMPERATURE: 98.4 F | SYSTOLIC BLOOD PRESSURE: 124 MMHG | BODY MASS INDEX: 27.36 KG/M2 | WEIGHT: 160.27 LBS | DIASTOLIC BLOOD PRESSURE: 48 MMHG | OXYGEN SATURATION: 94 %

## 2024-04-16 LAB
ANION GAP SERPL CALC-SCNC: 11 MMOL/L (ref 7–16)
BUN SERPL-MCNC: 24 MG/DL (ref 8–22)
CALCIUM SERPL-MCNC: 8.8 MG/DL (ref 8.5–10.5)
CHLORIDE SERPL-SCNC: 110 MMOL/L (ref 96–112)
CO2 SERPL-SCNC: 22 MMOL/L (ref 20–33)
CREAT SERPL-MCNC: 1.09 MG/DL (ref 0.5–1.4)
ERYTHROCYTE [DISTWIDTH] IN BLOOD BY AUTOMATED COUNT: 41.3 FL (ref 35.9–50)
GFR SERPLBLD CREATININE-BSD FMLA CKD-EPI: 71 ML/MIN/1.73 M 2
GLUCOSE SERPL-MCNC: 129 MG/DL (ref 65–99)
HCT VFR BLD AUTO: 37.1 % (ref 42–52)
HGB BLD-MCNC: 12.5 G/DL (ref 14–18)
MCH RBC QN AUTO: 30.2 PG (ref 27–33)
MCHC RBC AUTO-ENTMCNC: 33.7 G/DL (ref 32.3–36.5)
MCV RBC AUTO: 89.6 FL (ref 81.4–97.8)
PLATELET # BLD AUTO: 198 K/UL (ref 164–446)
PMV BLD AUTO: 10.2 FL (ref 9–12.9)
POTASSIUM SERPL-SCNC: 4.6 MMOL/L (ref 3.6–5.5)
RBC # BLD AUTO: 4.14 M/UL (ref 4.7–6.1)
SODIUM SERPL-SCNC: 143 MMOL/L (ref 135–145)
WBC # BLD AUTO: 15.2 K/UL (ref 4.8–10.8)

## 2024-04-16 PROCEDURE — 80048 BASIC METABOLIC PNL TOTAL CA: CPT

## 2024-04-16 PROCEDURE — 97535 SELF CARE MNGMENT TRAINING: CPT

## 2024-04-16 PROCEDURE — 700111 HCHG RX REV CODE 636 W/ 250 OVERRIDE (IP): Mod: JZ | Performed by: PHYSICIAN ASSISTANT

## 2024-04-16 PROCEDURE — 700101 HCHG RX REV CODE 250: Performed by: PHYSICIAN ASSISTANT

## 2024-04-16 PROCEDURE — A9270 NON-COVERED ITEM OR SERVICE: HCPCS | Performed by: PHYSICIAN ASSISTANT

## 2024-04-16 PROCEDURE — 97161 PT EVAL LOW COMPLEX 20 MIN: CPT

## 2024-04-16 PROCEDURE — 85027 COMPLETE CBC AUTOMATED: CPT

## 2024-04-16 PROCEDURE — 700102 HCHG RX REV CODE 250 W/ 637 OVERRIDE(OP): Performed by: PHYSICIAN ASSISTANT

## 2024-04-16 PROCEDURE — 99024 POSTOP FOLLOW-UP VISIT: CPT | Performed by: PHYSICIAN ASSISTANT

## 2024-04-16 RX ORDER — BACLOFEN 5 MG/1
5 TABLET ORAL 3 TIMES DAILY PRN
Qty: 45 TABLET | Refills: 0 | Status: SHIPPED | OUTPATIENT
Start: 2024-04-16 | End: 2024-04-23

## 2024-04-16 RX ORDER — CEPHALEXIN 500 MG/1
500 CAPSULE ORAL 4 TIMES DAILY
Qty: 20 CAPSULE | Refills: 0 | Status: ACTIVE | OUTPATIENT
Start: 2024-04-16

## 2024-04-16 RX ORDER — OXYCODONE HYDROCHLORIDE AND ACETAMINOPHEN 5; 325 MG/1; MG/1
1 TABLET ORAL EVERY 4 HOURS PRN
Qty: 30 TABLET | Refills: 0 | Status: SHIPPED | OUTPATIENT
Start: 2024-04-16 | End: 2024-04-21

## 2024-04-16 RX ADMIN — CHOLECALCIFEROL TAB 125 MCG (5000 UNIT) 5000 UNITS: 125 TAB at 05:26

## 2024-04-16 RX ADMIN — ACETAMINOPHEN 1000 MG: 500 TABLET, FILM COATED ORAL at 09:51

## 2024-04-16 RX ADMIN — DEXAMETHASONE SODIUM PHOSPHATE 4 MG: 4 INJECTION, SOLUTION INTRAMUSCULAR; INTRAVENOUS at 12:50

## 2024-04-16 RX ADMIN — SERTRALINE 50 MG: 50 TABLET, FILM COATED ORAL at 05:26

## 2024-04-16 RX ADMIN — POTASSIUM CHLORIDE AND SODIUM CHLORIDE: 900; 150 INJECTION, SOLUTION INTRAVENOUS at 00:49

## 2024-04-16 RX ADMIN — ACETAMINOPHEN 1000 MG: 500 TABLET, FILM COATED ORAL at 03:36

## 2024-04-16 RX ADMIN — PREGABALIN 50 MG: 25 CAPSULE ORAL at 05:25

## 2024-04-16 RX ADMIN — DEXAMETHASONE SODIUM PHOSPHATE 4 MG: 4 INJECTION, SOLUTION INTRAMUSCULAR; INTRAVENOUS at 05:27

## 2024-04-16 RX ADMIN — DEXAMETHASONE SODIUM PHOSPHATE 4 MG: 4 INJECTION, SOLUTION INTRAMUSCULAR; INTRAVENOUS at 00:43

## 2024-04-16 ASSESSMENT — COGNITIVE AND FUNCTIONAL STATUS - GENERAL
MOVING FROM LYING ON BACK TO SITTING ON SIDE OF FLAT BED: A LITTLE
CLIMB 3 TO 5 STEPS WITH RAILING: A LITTLE
TURNING FROM BACK TO SIDE WHILE IN FLAT BAD: A LITTLE
WALKING IN HOSPITAL ROOM: A LITTLE
MOVING TO AND FROM BED TO CHAIR: A LITTLE
MOBILITY SCORE: 18
STANDING UP FROM CHAIR USING ARMS: A LITTLE
SUGGESTED CMS G CODE MODIFIER MOBILITY: CK

## 2024-04-16 ASSESSMENT — PAIN DESCRIPTION - PAIN TYPE
TYPE: ACUTE PAIN;SURGICAL PAIN
TYPE: SURGICAL PAIN

## 2024-04-16 ASSESSMENT — GAIT ASSESSMENTS
DEVIATION: DECREASED BASE OF SUPPORT
ASSISTIVE DEVICE: FRONT WHEEL WALKER
GAIT LEVEL OF ASSIST: STANDBY ASSIST
DISTANCE (FEET): 100

## 2024-04-16 NOTE — THERAPY
Physical Therapy   Initial Evaluation     Patient Name: Miguel Carranza  Age:  74 y.o., Sex:  male  Medical Record #: [id number]  Today's Date: 4/16/2024     Precautions  Precautions: (P) Fall Risk;Spinal / Back Precautions     Assessment    74 y.o. male presents with spinal precautions and decreased balance impairing his mobility s/p L1-L3 laminectomies with spinal tumor resection.  He livs with his wife in a 1SH with 1 step to enter and was independent for mobility without an AD.  He now requires the use of a FWW due to 2 losses of balance initiating gait.  Pt was given a handout and educated on his spinal precautions and log rolling.  He will benefit from continued acute PT services to address the above deficits in order to return home safely.  Recommend home health PT services.    Plan    Physical Therapy Initial Treatment Plan   Treatment Plan : (P) Bed Mobility, Equipment, Gait Training, Manual Therapy, Neuro Re-Education / Balance, Self Care / Home Evaluation, Stair Training, Therapeutic Activities, Therapeutic Exercise  Treatment Frequency: (P) 4 Times per Week  Duration: (P) Until Therapy Goals Met    DC Equipment Recommendations: (P) Front-Wheel Walker  Discharge Recommendations: (P) Recommend home health for continued physical therapy services            Objective       04/16/24 1226   Initial Contact Note    Initial Contact Note Order Received and Verified, Physical Therapy Evaluation in Progress with Full Report to Follow.   Precautions   Precautions Fall Risk;Spinal / Back Precautions    Vitals   Pulse 75  (sitting, 73 standing, 80 /p ambulation)   Blood Pressure  122/63  (sitting, 117/64 standing, 131/62 /p ambulation)   Pain 0 - 10 Group   Therapist Pain Assessment 0   Prior Living Situation   Housing / Facility 1 Story House   Steps Into Home 1   Rail None   Bathroom Set up Walk In Shower;Bathtub / Shower Combination   Equipment Owned None   Lives with - Patient's Self Care Capacity Spouse  (will  be home to help hiim as needed)   Prior Level of Functional Mobility   Bed Mobility Independent   Transfer Status Independent   Ambulation Independent   Ambulation Distance community   Assistive Devices Used None   Stairs Independent   Comments likes to garden, drives, doesn't work   History of Falls   History of Falls No   Cognition    Cognition / Consciousness WDL   Level of Consciousness Alert   Active ROM Lower Body    Active ROM Lower Body  WDL   Strength Lower Body   Lower Body Strength  WDL   Sensation Lower Body   Lower Extremity Sensation   WDL   Coordination Lower Body    Coordination Lower Body  X   Comments ataxia ambulating without an AD   Balance Assessment   Sitting Balance (Static) Fair +   Sitting Balance (Dynamic) Fair +   Standing Balance (Static) Fair   Standing Balance (Dynamic) Fair -   Weight Shift Sitting Good   Weight Shift Standing Fair   Bed Mobility    Supine to Sit Supervised   Scooting Independent   Rolling Supervised   Comments bed flat, no rail, VCs for log rolling   Gait Analysis   Gait Level Of Assist Standby Assist   Assistive Device Front Wheel Walker   Distance (Feet) 100   # of Times Distance was Traveled 2   Deviation Decreased Base Of Support  (intermittent scissoring)   # of Stairs Climbed 2  (B rails)   Level of Assist with Stairs Standby Assist   Weight Bearing Status FWB   Comments Pt demonstrated 2 immediate losses of balance ambulating without AD x2', LOB to the L   Functional Mobility   Sit to Stand Standby Assist   Bed, Chair, Wheelchair Transfer Standby Assist  (with FWW)   Transfer Method Stand Step   Mobility with FWW   Activity Tolerance   Sitting in Chair post session   Edema / Skin Assessment   Edema / Skin  Not Assessed   Patient / Family Goals    Patient / Family Goal #1 to get back to taking care of his lawn and gardening   Short Term Goals    Short Term Goal # 1 Pt will perform sit < > stand SPV in 6 visits in order to prepare for ambulation.   Short Term  Goal # 2 Pt will ambulate 150' SPV without AD in 6 visits in order to progress household ammbulation.   Short Term Goal # 3 {Pt jm scend/ descend 1 stesp with BUE support SPV in 6 visits in order to access his home.   Education Group   Education Provided Spine Precautions;Role of Physical Therapist;Use of Assistive Device   Spine Precautions Patient Response Patient;Acceptance;Explanation;Handout;Action Demonstration   Role of Physical Therapist Patient Response Patient;Acceptance;Explanation;Action Demonstration   Use of Assistive Device Patient Response Patient;Acceptance;Explanation;Action Demonstration   Physical Therapy Initial Treatment Plan    Treatment Plan  Bed Mobility;Equipment;Gait Training;Manual Therapy;Neuro Re-Education / Balance;Self Care / Home Evaluation;Stair Training;Therapeutic Activities;Therapeutic Exercise   Treatment Frequency 4 Times per Week   Duration Until Therapy Goals Met   Problem List    Problems Impaired Bed Mobility;Impaired Transfers;Impaired Ambulation;Impaired Balance;Limited Knowledge of Post-Op Precautions   Anticipated Discharge Equipment and Recommendations   DC Equipment Recommendations Front-Wheel Walker   Discharge Recommendations Recommend home health for continued physical therapy services   Interdisciplinary Plan of Care Collaboration   IDT Collaboration with  Nursing   Patient Position at End of Therapy Seated;Chair Alarm On;Call Light within Reach;Tray Table within Reach   Collaboration Comments RN updated   Session Information   Date / Session Number  4/16 -1 (1/4, 4/20)

## 2024-04-16 NOTE — CARE PLAN
The patient is Stable - Low risk of patient condition declining or worsening    Shift Goals  Clinical Goals: lay flat, pain control  Patient Goals: comfort, rest  Family Goals: not present      Problem: Knowledge Deficit - Standard  Goal: Patient and family/care givers will demonstrate understanding of plan of care, disease process/condition, diagnostic tests and medications  Outcome: Progressing     Problem: Risk for Aspiration  Goal: Patient's risk for aspiration will be absent or decrease  Outcome: Progressing     Problem: Self Care  Goal: Patient will have the ability to perform ADLs independently or with assistance (bathe, groom, dress, toilet and feed)  Outcome: Progressing

## 2024-04-16 NOTE — PROGRESS NOTES
Assumed care of patient and received report from Renetta AKINS. Assessment completed.Pt A&Ox 4. Respirations are even and unlabored on 1.5LNC. Pt denies pain. Pt laying flat per MD order. Medical pt, call light and belongings are within reach. POC updated. Pt educated on room and call light, pt verbalized understanding. Needs met.

## 2024-04-16 NOTE — DISCHARGE PLANNING
RN LILIANE met with patient at bedside to complete assessment.   Patient A&Ox4 and able to verify information on face sheet.   Patient lives with spouse in their own home in [facility].   Independent with ADLs and IADLs at baseline.   Does not own or use DME, including oxygen, at baseline.   Owns a vehicle and drives.   His wife, Jaqueline, is functional and able to drive patient home upon discharge and during recovery.   No financial, mental health or substance misuse concerns.   PCP is Bari MENDIOLA.   Pending PT/OT evaluations/recommendations.   RN CM to remain available for referrals that are recommended (DME/HH).   Will be driven home via private transportation from wife.   1322: PT recommending HH and FWW. RN LILIANE obtained choice for Home Health: 1. [employer] [facility]  DME: 1. [facility]  RN LILIANE faxed signed choice forms to [facility] to upload into media and send appropriate referrals.   RN CM requested provider place orders for HH and FWW.     Case Management Discharge Planning    Admission Date: 4/15/2024  GMLOS: 1.9  ALOS: 1    6-Clicks ADL Score:    6-Clicks Mobility Score:        Anticipated Discharge Dispo: Discharge Disposition: Discharged to home/self care (01)  [facility] [address] [facility] [address]  Discharge Contact Phone Number: [phone]    DME Needed: No    Action(s) Taken: Updated Provider/Nurse on Discharge Plan, Patient Conference, and DC Assessment Complete (See below)    Escalations Completed: PT    Medically Clear: After PT/OT evaluation    Next Steps: Potential for need to send DME referral.     Barriers to Discharge: Pending PT Evaluation    Is the patient up for discharge tomorrow: Today    Care Transition Team Assessment    Information Source  Orientation Level: Oriented X4  Information Given By: Patient  Informant's Name: Miguel  Who is responsible for making decisions for patient? : Patient    Readmission Evaluation  Is this a readmission?: No    Elopement Risk  Legal Hold:  No  Ambulatory or Self Mobile in Wheelchair: No-Not an Elopement Risk  Elopement Risk: Not at Risk for Elopement    Interdisciplinary Discharge Planning  Does Admitting Nurse Feel This Could be a Complex Discharge?: No  Primary Care Physician: Bari MENDIOLA    Discharge Preparedness  What is your plan after discharge?: Home with help  What are your discharge supports?: Spouse  Prior Functional Level: Ambulatory, Drives Self, Independent with Activities of Daily Living, Independent with Medication Management  Difficulity with ADLs: None  Difficulity with IADLs: None    Functional Assesment  Prior Functional Level: Ambulatory, Drives Self, Independent with Activities of Daily Living, Independent with Medication Management    Finances  Financial Barriers to Discharge: No  Prescription Coverage: Yes    Vision / Hearing Impairment  Right Eye Vision: (P) Wears Glasses, Impaired  Left Eye Vision: (P) Wears Glasses, Impaired    Values / Beliefs / Concerns  Values / Beliefs Concerns : No    Advance Directive  Advance Directive?: None  Advance Directive offered?: AD Booklet refused    Psychological Assessment  History of Substance Abuse: None  History of Psychiatric Problems: No  Non-compliant with Treatment: No  Newly Diagnosed Illness: Yes    Discharge Risks or Barriers  Discharge risks or barriers?: No    Anticipated Discharge Information  Discharge Disposition: Discharged to home/self care (01)  [address] [facility] [address]  Discharge Contact Phone Number: [phone]

## 2024-04-16 NOTE — FACE TO FACE
Face to Face Note  -  Durable Medical Equipment    Sony Beaver P.A.-C. - NPI: [id number]  I certify that this patient is under my care and that they had a durable medical equipment(DME)face to face encounter by myself that meets the physician DME face-to-face encounter requirements with this patient on:    Date of encounter:   Patient:                    MRN:                       YOB: 2024  Miguel Carranza  [id number]  1950     The encounter with the patient was in whole, or in part, for the following medical condition, which is the primary reason for durable medical equipment:  Post-Op Surgery    I certify that, based on my findings, the following durable medical equipment is medically necessary:    Walkers.    My Clinical findings support the need for the above equipment due to:  Abnormal Gait Fall Risk

## 2024-04-16 NOTE — CARE PLAN
The patient is Stable - Low risk of patient condition declining or worsening    Shift Goals  Clinical Goals: d/c bedrest; mobility  Patient Goals: go home  Family Goals: not present    Progress made toward(s) clinical / shift goals:    Problem: Knowledge Deficit - Standard  Goal: Patient and family/care givers will demonstrate understanding of plan of care, disease process/condition, diagnostic tests and medications  Outcome: Met     Problem: Risk for Aspiration  Goal: Patient's risk for aspiration will be absent or decrease  Outcome: Met     Problem: Self Care  Goal: Patient will have the ability to perform ADLs independently or with assistance (bathe, groom, dress, toilet and feed)  Outcome: Met       Patient is not progressing towards the following goals:

## 2024-04-16 NOTE — DISCHARGE SUMMARY
Discharge Summary    CHIEF COMPLAINT ON ADMISSION  No chief complaint on file.      Reason for Admission  Intradural extramedullary spinal tumor  L1-L3 laminectomy with excision of intradural extramedullary tumor    Admission Date  4/15/2024    CODE STATUS  Full Code    HPI & HOSPITAL COURSE  This is a 74 y.o. male here with intradural extramedullary spinal tumor.  He underwent the above operation without complications.  He was transferred to the orthopedic/spine floor.  He was kept on bedrest overnight.  On postoperative day #1 he was slowly taken off of bedrest.  He denied positional headache.  He worked with physical therapy.  He was hemodynamically and neurologically stable.  The wound was dry.  At that time it was determined he met criteria for discharge and was discharged home in stable condition.    No notes on file    Therefore, he is discharged in good and stable condition to home with close outpatient follow-up.    The patient recovered much more quickly than anticipated on admission.    Discharge Date  4/16/2024    FOLLOW UP ITEMS POST DISCHARGE  Follow-up with Dr. Garrido in 2 and 6 weeks  No NSAIDs  No activities more strenuous than walking  He may shower in 2 days but is not to submerge the wound  He is to contact Dr. Garrido's office with any questions or concerns    DISCHARGE DIAGNOSES  Principal Problem:    Intradural extramedullary spinal tumor (POA: Unknown)  Resolved Problems:    * No resolved hospital problems. *      FOLLOW UP  Future Appointments   Date Time Provider Department Center   5/3/2024 11:00 AM MILAN [facility]   5/23/2024 10:30 AM MILAN Luna [facility]     No follow-up provider specified.    MEDICATIONS ON DISCHARGE     Medication List        START taking these medications        Instructions   baclofen 5 MG Tabs  Commonly known as: Lioresal   Take 1 Tablet by mouth 3 times a day as needed (Muscle spasms).  Dose: 5 mg     cephALEXin  500 MG Caps  Commonly known as: Keflex   Take 1 Capsule by mouth 4 times a day.  Dose: 500 mg     oxyCODONE-acetaminophen 5-325 MG Tabs  Commonly known as: Percocet   Take 1 Tablet by mouth every four hours as needed for Moderate Pain for up to 5 days.  Dose: 1 Tablet            CONTINUE taking these medications        Instructions   atorvastatin 40 MG Tabs  Commonly known as: Lipitor   Take 1 Tablet by mouth every evening.  Dose: 40 mg     ezetimibe 10 MG Tabs  Commonly known as: Zetia   Take 1 Tablet by mouth every day.  Dose: 10 mg     METAMUCIL FIBER PO   Take 1 Tablet by mouth every day.  Dose: 1 Tablet     pregabalin 50 MG capsule  Commonly known as: Lyrica   Take 50 mg by mouth 2 times a day.  Dose: 50 mg     sertraline 50 MG Tabs  Commonly known as: Zoloft   Take 1 Tablet by mouth every day.  Dose: 50 mg     vitamin D 2000 UNIT Tabs   Take 1 Tablet by mouth every evening.  Dose: 2,000 Units            STOP taking these medications      Excedrin Extra Strength 250-250-65 MG Tabs  Generic drug: asa/apap/caffeine     therapeutic multivitamin-minerals Tabs     Vitamin C 250 MG Chew              Allergies  Allergies   Allergen Reactions    Bactrim Ds      Rash       DIET  Orders Placed This Encounter   Procedures    Diet Order Diet: Regular; Miscellaneous modifications: (optional): Finger Foods     Standing Status:   Standing     Number of Occurrences:   1     Order Specific Question:   Diet:     Answer:   Regular [1]     Order Specific Question:   Miscellaneous modifications: (optional)     Answer:   Finger Foods  [2]       ACTIVITY  As tolerated.  Weight bearing as tolerated    CONSULTATIONS  None    PROCEDURES  L1-L3 laminectomy with excision of intradural extramedullary tumor    LABORATORY  Lab Results   Component Value Date    SODIUM 143 04/16/2024    POTASSIUM 4.6 04/16/2024    CHLORIDE 110 04/16/2024    CO2 22 04/16/2024    GLUCOSE 129 (H) 04/16/2024    BUN 24 (H) 04/16/2024    CREATININE 1.09 04/16/2024         Lab Results   Component Value Date    WBC 15.2 (H) 04/16/2024    HEMOGLOBIN 12.5 (L) 04/16/2024    HEMATOCRIT 37.1 (L) 04/16/2024    PLATELETCT 198 04/16/2024        Total time of the discharge process exceeds 30 minutes.

## 2024-04-16 NOTE — FACE TO FACE
Face to Face Supporting Documentation - Home Health    The encounter with this patient was in whole or in part the primary reason for home health admission.    Date of encounter:   Patient:                    MRN:                       YOB: 2024  Miguel Carranza  [id number]  1950     Home health to see patient for:  Skilled Nursing care for assessment, interventions & education and Physical Therapy evaluation and treatment    Skilled need for:  Surgical Aftercare S/P resection of intradural tumor    Skilled nursing interventions to include:  Comment: PT    Homebound status evidenced by:  Need the aid of supportive devices such as crutches, canes, wheelchairs or walkers. Leaving home requires a considerable and taxing effort. There is a normal inability to leave the home.    Community Physician to provide follow up care: MARY Moore     Optional Interventions? Yes, Details: PT      I certify the face to face encounter for this home health care referral meets the CMS requirements and the encounter/clinical assessment with the patient was, in whole, or in part, for the medical condition(s) listed above, which is the primary reason for home health care. Based on my clinical findings: the service(s) are medically necessary, support the need for home health care, and the homebound criteria are met.  I certify that this patient has had a face to face encounter by myself.  Sony Beaver P.A.-C. - NPI: [id number]

## 2024-04-16 NOTE — PROGRESS NOTES
Neurosurgery Progress Note    Subjective:  POD #1 seen with Dr. Garrido  On Bed rest  Denies h/a  No LE symptoms  Feng placed  MARY drain still in place    Exam:  Wound: C/D/I  Labs stable  VSS  Drain: 20cc  LE motor 5/5 throughout bilaterally    BP  Min: 92/54  Max: 140/76  Pulse  Av.2  Min: 72  Max: 96  Resp  Av.4  Min: 12  Max: 22  Temp  Av.7 °C (98.1 °F)  Min: 36.2 °C (97.2 °F)  Max: 37.2 °C (99 °F)  SpO2  Av.9 %  Min: 92 %  Max: 97 %    No data recorded    Recent Labs     24  0536   WBC 15.2*   RBC 4.14*   HEMOGLOBIN 12.5*   HEMATOCRIT 37.1*   MCV 89.6   MCH 30.2   MCHC 33.7   RDW 41.3   PLATELETCT 198   MPV 10.2     Recent Labs     24  0536   SODIUM 143   POTASSIUM 4.6   CHLORIDE 110   CO2 22   GLUCOSE 129*   BUN 24*   CREATININE 1.09   CALCIUM 8.8               Intake/Output                         04/15/24 0700 - 24 0659 24 0700 - 24 0659     9825-1855 3155-8817 Total 1976-0857 8056-9366 Total                 Intake    P.O.  240  -- 240  --  -- --    P.O. 240 -- 240 -- -- --    I.V.  1200  -- 1200  --  -- --    Volume (mL) (lactated ringers infusion) 1200 -- 1200 -- -- --    Total Intake 1440 -- 1440 -- -- --       Output    Urine  775  2300 3075  --  -- --    Urine 100 -- 100 -- -- --    Output (mL) (Urethral Catheter Non-latex 16 Fr.) 675 2300 2975 -- -- --    Emesis  --  -- --  --  -- --    Emesis - Number of Times -- 1 x 1 x -- -- --    Drains  0  70 70  --  -- --    Output (mL) (Closed/Suction Drain Posterior Back Kirit Diallo 10 Fr.) 0 70 70 -- -- --    Stool  --  -- --  --  -- --    Number of Times Stooled -- 0 x 0 x -- -- --    Blood  25  -- 25  --  -- --    Est. Blood Loss 25 -- 25 -- -- --    Total Output 800 2370 3170 -- -- --       Net I/O     640 -2370 -1730 -- -- --              Intake/Output Summary (Last 24 hours) at 2024 0752  Last data filed at 2024 0400  Gross per 24 hour   Intake 1440 ml   Output 3170 ml   Net -1730 ml              atorvastatin  40 mg Q EVENING    ezetimibe  10 mg Q EVENING    pregabalin  50 mg BID    sertraline  50 mg DAILY    Pharmacy Consult Request  1 Each PHARMACY TO DOSE    MD ALERT...DO NOT ADMINISTER NSAIDS or ASPIRIN unless ORDERED By Neurosurgery  1 Each PRN    docusate sodium  100 mg BID    senna-docusate  1 Tablet Nightly    senna-docusate  1 Tablet Q24HRS PRN    polyethylene glycol/lytes  1 Packet BID PRN    magnesium hydroxide  30 mL QDAY PRN    bisacodyl  10 mg Q24HRS PRN    sodium phosphate  1 Each Once PRN    0.9 % NaCl with KCl 20 mEq 1,000 mL   Continuous    acetaminophen  1,000 mg Q6HRS    Followed by    [START ON 4/20/2024] acetaminophen  1,000 mg Q6HRS PRN    oxyCODONE immediate-release  5 mg Q3HRS PRN    Or    oxyCODONE immediate-release  10 mg Q3HRS PRN    Or    HYDROmorphone  0.5 mg Q3HRS PRN    diphenhydrAMINE  25 mg Q6HRS PRN    Or    diphenhydrAMINE  25 mg Q6HRS PRN    ondansetron  4 mg Q4HRS PRN    ondansetron  4 mg Q4HRS PRN    baclofen  5 mg TID PRN    ALPRAZolam  0.25 mg BID PRN    hydrALAZINE  10 mg Q HOUR PRN    benzocaine-menthol  1 Lozenge Q2HRS PRN    vitamin D3  5,000 Units DAILY    dexamethasone  4 mg Q12HRS    dexamethasone  4 mg Q6HRS    ropivacaine 0.2 % (Naropin) 270 mL in On-Q Pump infusion   Continuous       Assessment and Plan:  Hospital day #2  POD #1    Plan:  D/c drain this am  Slowly sit up  Mobilize with PT/OT later this am  D/C On Q at 1600 hrs  D/C home at 1600hrs  Meds to beds for scripts

## 2024-04-17 NOTE — PROGRESS NOTES
Patient to be discharged today - patient aware and agreeable to plan. D/c instructions reviewed with patient - ?'s/concerns answered. No piv present, meds to be picked up at [facility]. FWW with patient.

## 2024-04-17 NOTE — DISCHARGE INSTRUCTIONS
Laminectomy, Care After  The following information offers guidance on how to care for yourself after your procedure. Your health care provider may also give you more specific instructions. If you have problems or questions, contact your health care provider.  What can I expect after the procedure?  After the procedure, it is common to have:  Some pain around your incision area.  Muscle tightening (spasms) across the back.  Follow these instructions at home:  Medicines  Take over-the-counter and prescription medicines only as told by your health care provider.  If you were prescribed antibiotics, use them as told by your health care provider. Do not stop using the antibiotic even if you start to feel better.  If you are taking blood thinners:  Talk with your health care provider before you take any medicines that contain aspirin or NSAIDs, such as ibuprofen. These medicines increase your risk for dangerous bleeding.  Take your medicine exactly as told, at the same time every day.  Avoid activities that could cause injury or bruising, and follow instructions about how to prevent falls.  Wear a medical alert bracelet or carry a card that lists what medicines you take.  Ask your health care provider if the medicine prescribed to you:  Requires you to avoid driving or using machinery.  Can cause constipation. You may need to take these actions to prevent or treat constipation:  Drink enough fluid to keep your urine pale yellow.  Take over-the-counter or prescription medicines.  Eat foods that are high in fiber, such as beans, whole grains, and fresh fruits and vegetables.  Limit foods that are high in fat and processed sugars, such as fried or sweet foods.  Do not drink alcohol if you are taking prescription pain medicine.  Incision care    Follow instructions from your health care provider about how to take care of your incision. Make sure you:  Wash your hands with soap and water for at least 20 seconds before and  after you change your bandage (dressing). If soap and water are not available, use hand .  Change your dressing as told by your health care provider.  Leave stitches (sutures), skin glue, or adhesive strips in place. These skin closures may need to stay in place for 2 weeks or longer. If adhesive strip edges start to loosen and curl up, you may trim the loose edges. Do not remove adhesive strips completely unless your health care provider tells you to do that.  Check your incision area every day for signs of infection. Check for:  More redness, swelling, or pain.  Fluid or blood.  Warmth.  Pus or a bad smell.  Activity    Rest as told by your health care provider.  Avoid bending or twisting at your waist. Always bend at your knees.  Do not sit for a long time without moving. Get up to take short walks every 1-2 hours. This will improve blood flow and breathing. Ask for help if you feel weak or unsteady.  You may have to avoid lifting. Ask your health care provider how much you can safely lift.  Do exercises as told by your health care provider.  Return to your normal activities as told by your health care provider. Ask your health care provider what activities are safe for you.  General instructions    Do not use any products that contain nicotine or tobacco. These products include cigarettes, chewing tobacco, and vaping devices, such as e-cigarettes. These can delay bone healing after surgery. If you need help quitting, ask your health care provider.  Do not take baths, swim, or use a hot tub until your health care provider approves. Ask your health care provider if you may take showers. You may only be allowed to take sponge baths.  Do not drive for 2 weeks after your procedure or for as long as told by your health care provider.  Wear compression stockings as told by your health care provider. These stockings help to prevent blood clots and reduce swelling in your legs.  Keep all follow-up visits. This  is important so that your health care provider can monitor your healing and symptom improvement.  Contact a health care provider if:  You are not able to return to activities or do exercises as told by your health care provider.  You have any of these signs of infection:  Chills or a fever.  More redness, swelling, or pain around your incision.  Warmth at your incision area.  Fluid or blood coming from your incision.  Pus or a bad smell coming from your incision.  You develop a rash.  Get help right away if:  You feel dizzy or you faint while standing.  You develop shortness of breath or have difficulty breathing.  You cannot control when you urinate or have a bowel movement.  You become weak.  You are not able to use your legs.  These symptoms may be an emergency. Get help right away. Call 911.  Do not wait to see if the symptoms will go away.  Do not drive yourself to the hospital.  Summary  After the procedure, it is common to have some pain around your incision area or muscle tightening (spasms) across the back.  Follow instructions from your health care provider about how to care for your incision area.  You may have to avoid lifting. Ask your health care provider how much you can safely lift.  Contact your health care provider if you have signs of infection, such as more redness, swelling, or pain around your incision or if your incision feels warm to the touch.  This information is not intended to replace advice given to you by your health care provider. Make sure you discuss any questions you have with your health care provider.  Document Revised: 03/29/2023 Document Reviewed: 03/29/2023  [employer] Patient Education © 2023 [employer]

## 2024-04-18 ENCOUNTER — HOSPITAL ENCOUNTER (OUTPATIENT)
Dept: RADIOLOGY | Facility: MEDICAL CENTER | Age: 74
End: 2024-04-18
Payer: MEDICARE

## 2024-05-30 NOTE — ASSESSMENT & PLAN NOTE
History of alcohol dependence and blood alcohol of 0.151 on presentation to outside facility  High-dose IV thiamine and supplemental vitamins  Cessation counseling when clinically appropriate   History of prostate cancer with resection.  He reports that he is now able to see Dr. Brooke with [facility]rology [place] annually for surveillance.

## 2024-08-27 ENCOUNTER — HOSPITAL ENCOUNTER (OUTPATIENT)
Dept: RADIOLOGY | Facility: MEDICAL CENTER | Age: 74
End: 2024-08-27
Attending: PHYSICIAN ASSISTANT
Payer: MEDICARE

## 2024-08-27 DIAGNOSIS — M54.16 LUMBAR RADICULOPATHY: ICD-10-CM

## 2024-08-27 DIAGNOSIS — D49.7 INTRADURAL EXTRAMEDULLARY SPINAL TUMOR: ICD-10-CM

## 2024-08-27 DIAGNOSIS — M48.062 SPINAL STENOSIS OF LUMBAR REGION WITH NEUROGENIC CLAUDICATION: ICD-10-CM

## 2024-08-27 PROCEDURE — A9579 GAD-BASE MR CONTRAST NOS,1ML: HCPCS | Mod: JZ | Performed by: PHYSICIAN ASSISTANT

## 2024-08-27 PROCEDURE — 700117 HCHG RX CONTRAST REV CODE 255: Mod: JZ | Performed by: PHYSICIAN ASSISTANT

## 2024-08-27 PROCEDURE — 72158 MRI LUMBAR SPINE W/O & W/DYE: CPT

## 2024-08-27 RX ADMIN — GADOTERIDOL 15 ML: 279.3 INJECTION, SOLUTION INTRAVENOUS at 09:27

## 2024-08-31 DIAGNOSIS — E78.2 MIXED HYPERLIPIDEMIA: ICD-10-CM

## 2024-09-03 RX ORDER — EZETIMIBE 10 MG/1
10 TABLET ORAL DAILY
Qty: 90 TABLET | Refills: 0 | Status: SHIPPED | OUTPATIENT
Start: 2024-09-03

## 2024-09-03 NOTE — TELEPHONE ENCOUNTER
Received request via: Pharmacy    Was the patient seen in the last year in this department? Yes    Does the patient have an active prescription (recently filled or refills available) for medication(s) requested? No    [facility]    Does the patient have assisted Plus and need 100-day supply? (This applies to ALL medications) Patient does not have SCP

## 2024-11-28 DIAGNOSIS — E78.2 MIXED HYPERLIPIDEMIA: ICD-10-CM

## 2024-12-02 RX ORDER — EZETIMIBE 10 MG/1
10 TABLET ORAL DAILY
Qty: 90 TABLET | Refills: 0 | Status: SHIPPED | OUTPATIENT
Start: 2024-12-02

## 2024-12-02 NOTE — TELEPHONE ENCOUNTER
Received request via: Pharmacy    Was the patient seen in the last year in this department? Yes    Does the patient have an active prescription (recently filled or refills available) for medication(s) requested? No    [facility]    Does the patient have detention Plus and need 100-day supply? (This applies to ALL medications) Patient does not have SCP

## 2025-01-17 DIAGNOSIS — E78.2 MIXED HYPERLIPIDEMIA: Chronic | ICD-10-CM

## 2025-01-18 NOTE — TELEPHONE ENCOUNTER
Received request via: Pharmacy    Was the patient seen in the last year in this department? Yes    Does the patient have an active prescription (recently filled or refills available) for medication(s) requested? No    [facility]    Does the patient have FPC Plus and need 100-day supply? (This applies to ALL medications) Patient does not have SCP

## 2025-01-20 RX ORDER — ATORVASTATIN CALCIUM 40 MG/1
40 TABLET, FILM COATED ORAL EVERY EVENING
Qty: 90 TABLET | Refills: 3 | Status: SHIPPED | OUTPATIENT
Start: 2025-01-20

## 2025-02-28 DIAGNOSIS — E78.2 MIXED HYPERLIPIDEMIA: ICD-10-CM

## 2025-02-28 NOTE — TELEPHONE ENCOUNTER
Received request via: Pharmacy    Was the patient seen in the last year in this department? Yes    Does the patient have an active prescription (recently filled or refills available) for medication(s) requested? No    [facility]    Does the patient have half-way Plus and need 100-day supply? (This applies to ALL medications) Patient does not have SCP

## 2025-03-04 RX ORDER — EZETIMIBE 10 MG/1
10 TABLET ORAL DAILY
Qty: 90 TABLET | Refills: 3 | Status: SHIPPED | OUTPATIENT
Start: 2025-03-04

## 2025-06-21 DIAGNOSIS — F33.42 RECURRENT MAJOR DEPRESSIVE DISORDER, IN FULL REMISSION (HCC): ICD-10-CM

## 2025-06-23 NOTE — TELEPHONE ENCOUNTER
Received request via: Pharmacy    Was the patient seen in the last year in this department? Yes    Does the patient have an active prescription (recently filled or refills available) for medication(s) requested? No    Pharmacy Name:   [facility]/pharmacy #[id number] - [place] - [address]  [facility] [place] [address]  Phone: [phone] Fax: [phone]      Does the patient have longterm Plus and need 100-day supply? (This applies to ALL medications) Patient does not have SCP

## 2025-06-24 NOTE — TELEPHONE ENCOUNTER
90 days supply given, will need an appointment for further refills. Please call [phone] to schedule

## (undated) DEVICE — SUTURE GOR-TEX CV-6 TT-9 (36PK/BX)

## (undated) DEVICE — CHLORAPREP 26 ML APPLICATOR - ORANGE TINT(25/CA)

## (undated) DEVICE — TIP ASPIRATOR SONOPET IQ STANDARD 12CM (4EA/PK)

## (undated) DEVICE — TOOL MR8 14CM MATCH HD SYM-TRI 3MM DIAMETER (1/EA)

## (undated) DEVICE — SODIUM CHL IRRIGATION 0.9% 1000ML (12EA/CA)

## (undated) DEVICE — HEADREST PRONEVIEW LARGE - (10/CA)

## (undated) DEVICE — DRAPE 36X28IN RAD CARM BND BG - (25/CA) O

## (undated) DEVICE — TUBING C&T SET FLYING LEADS DRAIN TUBING (10EA/BX)

## (undated) DEVICE — GOWN SURGEONS X-LARGE - DISP. (30/CA)

## (undated) DEVICE — CASSETTE IRRIGATION SONOPET IQ SUCTION (4EA/PK)

## (undated) DEVICE — GLOVE BIOGEL PI INDICATOR SZ 8.0 SURGICAL PF LF -(50/BX 4BX/CA)

## (undated) DEVICE — DRESSING NON ADHERENT 3 X 4 - STERILE (100/BX 12BX/CA)

## (undated) DEVICE — INTRAOP NEURO IN OR 1:1 PER 15 MIN

## (undated) DEVICE — SUTURE 4-0 NUROLON CR/8 TF - (12/BX) ETHICON

## (undated) DEVICE — SUTURE 2-0 VICRYL PLUS CT-1 - 8 X 18 INCH(12/BX)

## (undated) DEVICE — GLOVE SIZE 6.5  SURGEON ACCELERATOR FREE GREEN (50PR/BX)

## (undated) DEVICE — SUTURE 1 VICRYL PLUS CTX - 8 X 18 INCH (12/BX)

## (undated) DEVICE — DRAPE MICROSCOPE ARMATEC 120IN X 46IN (10EA/CA)

## (undated) DEVICE — CLIP SM INTNL HRZN TI ESCP LGT - (24EA/PK 25PK/BX)

## (undated) DEVICE — RESERVOIR SUCTION 100 CC - SILICONE (20EA/CA)

## (undated) DEVICE — GLOVE BIOGEL INDICATOR SZ 8 SURGICAL PF LTX - (50/BX 4BX/CA)

## (undated) DEVICE — KIT SURGIFLO W/OUT THROMBIN - (6EA/CA)

## (undated) DEVICE — DRAPE LAPAROTOMY T SHEET - (12EA/CA)

## (undated) DEVICE — SUCTION TUBE FRAZIER 12FR STERILE (40EA/CA)

## (undated) DEVICE — DRAPE SURG STERI-DRAPE 7X11OD - (40EA/CA)

## (undated) DEVICE — MIDAS LUBRICATOR DIFFUSER PACK (4EA/CA)

## (undated) DEVICE — SLEEVE, VASO, THIGH, MED

## (undated) DEVICE — KIT EVACUATER 3 SPRING PVC LF 1/8 DRAIN SIZE (10EA/CA)"

## (undated) DEVICE — SEALER BIPOLAR 2.3 AQUAMANTYS

## (undated) DEVICE — ELECTRODE DUAL RETURN W/ CORD - (50/PK)

## (undated) DEVICE — LACTATED RINGERS INJ. 500 ML - (24EA/CA)

## (undated) DEVICE — BLADE SURGICAL CLIPPER - (50EA/CA)

## (undated) DEVICE — CATHETER ON-Q SILVER SOAKER 5IN  (5EA/CA)  - SUB ORDER #4428

## (undated) DEVICE — SUTURE GENERAL

## (undated) DEVICE — COVER MAYO STAND X-LG - (22EA/CA)

## (undated) DEVICE — SPONGE GAUZESTER 4 X 4 4PLY - (128PK/CA)

## (undated) DEVICE — COVER LIGHT HANDLE ALC PLUS DISP (18EA/BX)

## (undated) DEVICE — TUBING CLEARLINK DUO-VENT - C-FLO (48EA/CA)

## (undated) DEVICE — DERMABOND ADVANCED - (12EA/BX)

## (undated) DEVICE — SUTURE ETHILON 2-0 FSLX 30 (36PK/BX)"

## (undated) DEVICE — ARMREST CRADLE FOAM - (2PR/PK 12PR/CA)

## (undated) DEVICE — DEVICE MONOPOLAR RF PEAK PLASMABLADE 3.0S

## (undated) DEVICE — SET LEADWIRE 5 LEAD BEDSIDE DISPOSABLE ECG (1SET OF 5/EA)

## (undated) DEVICE — GLOVE BIOGEL SZ 8 SURGICAL PF LTX - (50PR/BX 4BX/CA)

## (undated) DEVICE — PATTIES SURG X-RAYCOTTONOID - 1/2 X 3 IN (200/CA)

## (undated) DEVICE — PACK NEURO - (2EA/CA)

## (undated) DEVICE — SET EXTENSION WITH 2 PORTS (48EA/CA) ***PART #2C8610 IS A SUBSTITUTE*****

## (undated) DEVICE — SUTURE 2-0 VICRYL PLUS CT-1 36 (36PK/BX)"

## (undated) DEVICE — SUCTION INSTRUMENT YANKAUER BULBOUS TIP W/O VENT (50EA/CA)

## (undated) DEVICE — PATTIES SURG NEURO X-RAY 1/2X1/2 (10EA/PK 20PK/CS)

## (undated) DEVICE — SENSOR OXIMETER ADULT SPO2 RD SET (20EA/BX)

## (undated) DEVICE — CANISTER SUCTION 3000ML MECHANICAL FILTER AUTO SHUTOFF MEDI-VAC NONSTERILE LF DISP  (40EA/CA)

## (undated) DEVICE — GOWN WARMING STANDARD FLEX - (30/CA)

## (undated) DEVICE — LACTATED RINGERS INJ 1000 ML - (14EA/CA 60CA/PF)

## (undated) DEVICE — TIP EXTENDED DURAL SEALANT AUTOSPRAY ADHERUS (5EA/PK)